# Patient Record
Sex: FEMALE | Race: WHITE | NOT HISPANIC OR LATINO
[De-identification: names, ages, dates, MRNs, and addresses within clinical notes are randomized per-mention and may not be internally consistent; named-entity substitution may affect disease eponyms.]

---

## 2017-05-01 ENCOUNTER — APPOINTMENT (OUTPATIENT)
Dept: RADIOLOGY | Facility: CLINIC | Age: 82
End: 2017-05-01

## 2017-05-01 ENCOUNTER — OUTPATIENT (OUTPATIENT)
Dept: OUTPATIENT SERVICES | Facility: HOSPITAL | Age: 82
LOS: 1 days | End: 2017-05-01
Payer: MEDICARE

## 2017-05-01 DIAGNOSIS — Z00.8 ENCOUNTER FOR OTHER GENERAL EXAMINATION: ICD-10-CM

## 2017-05-01 PROCEDURE — 72100 X-RAY EXAM L-S SPINE 2/3 VWS: CPT

## 2017-05-01 PROCEDURE — 73502 X-RAY EXAM HIP UNI 2-3 VIEWS: CPT

## 2017-05-01 PROCEDURE — 73502 X-RAY EXAM HIP UNI 2-3 VIEWS: CPT | Mod: 26,LT

## 2017-05-01 PROCEDURE — 72100 X-RAY EXAM L-S SPINE 2/3 VWS: CPT | Mod: 26

## 2017-05-12 ENCOUNTER — OUTPATIENT (OUTPATIENT)
Dept: OUTPATIENT SERVICES | Facility: HOSPITAL | Age: 82
LOS: 1 days | Discharge: HOME | End: 2017-05-12
Payer: MEDICARE

## 2017-05-12 DIAGNOSIS — M79.605 PAIN IN LEFT LEG: ICD-10-CM

## 2017-05-12 PROCEDURE — 93926 LOWER EXTREMITY STUDY: CPT | Mod: 26,LT

## 2021-03-10 ENCOUNTER — FORM ENCOUNTER (OUTPATIENT)
Age: 86
End: 2021-03-10

## 2021-03-11 ENCOUNTER — TRANSCRIPTION ENCOUNTER (OUTPATIENT)
Age: 86
End: 2021-03-11

## 2021-03-11 ENCOUNTER — EMERGENCY (EMERGENCY)
Facility: HOSPITAL | Age: 86
LOS: 0 days | Discharge: HOME | End: 2021-03-11
Attending: EMERGENCY MEDICINE
Payer: MEDICARE

## 2021-03-11 VITALS
SYSTOLIC BLOOD PRESSURE: 156 MMHG | RESPIRATION RATE: 18 BRPM | TEMPERATURE: 98 F | OXYGEN SATURATION: 100 % | DIASTOLIC BLOOD PRESSURE: 92 MMHG | HEART RATE: 75 BPM

## 2021-03-11 VITALS — HEIGHT: 66 IN | WEIGHT: 149.91 LBS

## 2021-03-11 DIAGNOSIS — R55 SYNCOPE AND COLLAPSE: ICD-10-CM

## 2021-03-11 DIAGNOSIS — H35.3290 EXUDATIVE AGE-RELATED MACULAR DEGENERATION, UNSPECIFIED EYE, STAGE UNSPECIFIED: ICD-10-CM

## 2021-03-11 DIAGNOSIS — U07.1 COVID-19: ICD-10-CM

## 2021-03-11 DIAGNOSIS — N39.0 URINARY TRACT INFECTION, SITE NOT SPECIFIED: ICD-10-CM

## 2021-03-11 DIAGNOSIS — I10 ESSENTIAL (PRIMARY) HYPERTENSION: ICD-10-CM

## 2021-03-11 LAB
ALBUMIN SERPL ELPH-MCNC: 3.3 G/DL — SIGNIFICANT CHANGE UP (ref 3.3–5)
ALP SERPL-CCNC: 82 U/L — SIGNIFICANT CHANGE UP (ref 40–120)
ALT FLD-CCNC: 20 U/L — SIGNIFICANT CHANGE UP (ref 12–78)
ANION GAP SERPL CALC-SCNC: 6 MMOL/L — SIGNIFICANT CHANGE UP (ref 5–17)
APPEARANCE UR: CLEAR — SIGNIFICANT CHANGE UP
AST SERPL-CCNC: 32 U/L — SIGNIFICANT CHANGE UP (ref 15–37)
BASOPHILS # BLD AUTO: 0.03 K/UL — SIGNIFICANT CHANGE UP (ref 0–0.2)
BASOPHILS NFR BLD AUTO: 0.6 % — SIGNIFICANT CHANGE UP (ref 0–2)
BILIRUB SERPL-MCNC: 0.4 MG/DL — SIGNIFICANT CHANGE UP (ref 0.2–1.2)
BILIRUB UR-MCNC: NEGATIVE — SIGNIFICANT CHANGE UP
BUN SERPL-MCNC: 17 MG/DL — SIGNIFICANT CHANGE UP (ref 7–23)
CALCIUM SERPL-MCNC: 8.2 MG/DL — LOW (ref 8.5–10.1)
CHLORIDE SERPL-SCNC: 103 MMOL/L — SIGNIFICANT CHANGE UP (ref 96–108)
CO2 SERPL-SCNC: 25 MMOL/L — SIGNIFICANT CHANGE UP (ref 22–31)
COLOR SPEC: YELLOW — SIGNIFICANT CHANGE UP
CREAT SERPL-MCNC: 1.28 MG/DL — SIGNIFICANT CHANGE UP (ref 0.5–1.3)
DIFF PNL FLD: ABNORMAL
EOSINOPHIL # BLD AUTO: 0.02 K/UL — SIGNIFICANT CHANGE UP (ref 0–0.5)
EOSINOPHIL NFR BLD AUTO: 0.4 % — SIGNIFICANT CHANGE UP (ref 0–6)
GLUCOSE SERPL-MCNC: 105 MG/DL — HIGH (ref 70–99)
GLUCOSE UR QL: NEGATIVE MG/DL — SIGNIFICANT CHANGE UP
HCT VFR BLD CALC: 41.1 % — SIGNIFICANT CHANGE UP (ref 34.5–45)
HGB BLD-MCNC: 13.7 G/DL — SIGNIFICANT CHANGE UP (ref 11.5–15.5)
IMM GRANULOCYTES NFR BLD AUTO: 0.2 % — SIGNIFICANT CHANGE UP (ref 0–1.5)
KETONES UR-MCNC: NEGATIVE — SIGNIFICANT CHANGE UP
LEUKOCYTE ESTERASE UR-ACNC: ABNORMAL
LYMPHOCYTES # BLD AUTO: 1.27 K/UL — SIGNIFICANT CHANGE UP (ref 1–3.3)
LYMPHOCYTES # BLD AUTO: 23.6 % — SIGNIFICANT CHANGE UP (ref 13–44)
MCHC RBC-ENTMCNC: 30.2 PG — SIGNIFICANT CHANGE UP (ref 27–34)
MCHC RBC-ENTMCNC: 33.3 GM/DL — SIGNIFICANT CHANGE UP (ref 32–36)
MCV RBC AUTO: 90.7 FL — SIGNIFICANT CHANGE UP (ref 80–100)
MONOCYTES # BLD AUTO: 0.66 K/UL — SIGNIFICANT CHANGE UP (ref 0–0.9)
MONOCYTES NFR BLD AUTO: 12.3 % — SIGNIFICANT CHANGE UP (ref 2–14)
NEUTROPHILS # BLD AUTO: 3.39 K/UL — SIGNIFICANT CHANGE UP (ref 1.8–7.4)
NEUTROPHILS NFR BLD AUTO: 62.9 % — SIGNIFICANT CHANGE UP (ref 43–77)
NITRITE UR-MCNC: NEGATIVE — SIGNIFICANT CHANGE UP
PH UR: 6.5 — SIGNIFICANT CHANGE UP (ref 5–8)
PLATELET # BLD AUTO: 208 K/UL — SIGNIFICANT CHANGE UP (ref 150–400)
POTASSIUM SERPL-MCNC: 4 MMOL/L — SIGNIFICANT CHANGE UP (ref 3.5–5.3)
POTASSIUM SERPL-SCNC: 4 MMOL/L — SIGNIFICANT CHANGE UP (ref 3.5–5.3)
PROT SERPL-MCNC: 7.3 GM/DL — SIGNIFICANT CHANGE UP (ref 6–8.3)
PROT UR-MCNC: 30 MG/DL
RBC # BLD: 4.53 M/UL — SIGNIFICANT CHANGE UP (ref 3.8–5.2)
RBC # FLD: 13.4 % — SIGNIFICANT CHANGE UP (ref 10.3–14.5)
SODIUM SERPL-SCNC: 134 MMOL/L — LOW (ref 135–145)
SP GR SPEC: 1.01 — SIGNIFICANT CHANGE UP (ref 1.01–1.02)
TROPONIN I SERPL-MCNC: 0.02 NG/ML — SIGNIFICANT CHANGE UP (ref 0.01–0.04)
UROBILINOGEN FLD QL: NEGATIVE MG/DL — SIGNIFICANT CHANGE UP
WBC # BLD: 5.38 K/UL — SIGNIFICANT CHANGE UP (ref 3.8–10.5)
WBC # FLD AUTO: 5.38 K/UL — SIGNIFICANT CHANGE UP (ref 3.8–10.5)

## 2021-03-11 PROCEDURE — 72125 CT NECK SPINE W/O DYE: CPT | Mod: 26

## 2021-03-11 PROCEDURE — 80053 COMPREHEN METABOLIC PANEL: CPT

## 2021-03-11 PROCEDURE — 36415 COLL VENOUS BLD VENIPUNCTURE: CPT

## 2021-03-11 PROCEDURE — 70450 CT HEAD/BRAIN W/O DYE: CPT | Mod: 26

## 2021-03-11 PROCEDURE — 81001 URINALYSIS AUTO W/SCOPE: CPT

## 2021-03-11 PROCEDURE — 84484 ASSAY OF TROPONIN QUANT: CPT

## 2021-03-11 PROCEDURE — 70450 CT HEAD/BRAIN W/O DYE: CPT

## 2021-03-11 PROCEDURE — 93005 ELECTROCARDIOGRAM TRACING: CPT

## 2021-03-11 PROCEDURE — 93010 ELECTROCARDIOGRAM REPORT: CPT

## 2021-03-11 PROCEDURE — 99284 EMERGENCY DEPT VISIT MOD MDM: CPT

## 2021-03-11 PROCEDURE — 72125 CT NECK SPINE W/O DYE: CPT

## 2021-03-11 PROCEDURE — 87086 URINE CULTURE/COLONY COUNT: CPT

## 2021-03-11 PROCEDURE — 71045 X-RAY EXAM CHEST 1 VIEW: CPT

## 2021-03-11 PROCEDURE — 85025 COMPLETE CBC W/AUTO DIFF WBC: CPT

## 2021-03-11 PROCEDURE — 71045 X-RAY EXAM CHEST 1 VIEW: CPT | Mod: 26

## 2021-03-11 PROCEDURE — 0225U NFCT DS DNA&RNA 21 SARSCOV2: CPT

## 2021-03-11 PROCEDURE — 96374 THER/PROPH/DIAG INJ IV PUSH: CPT | Mod: XU

## 2021-03-11 PROCEDURE — 96375 TX/PRO/DX INJ NEW DRUG ADDON: CPT | Mod: XU

## 2021-03-11 RX ORDER — LABETALOL HCL 100 MG
10 TABLET ORAL ONCE
Refills: 0 | Status: COMPLETED | OUTPATIENT
Start: 2021-03-11 | End: 2021-03-11

## 2021-03-11 RX ORDER — CEFTRIAXONE 500 MG/1
1000 INJECTION, POWDER, FOR SOLUTION INTRAMUSCULAR; INTRAVENOUS ONCE
Refills: 0 | Status: COMPLETED | OUTPATIENT
Start: 2021-03-11 | End: 2021-03-11

## 2021-03-11 RX ORDER — CEPHALEXIN 500 MG
1 CAPSULE ORAL
Qty: 10 | Refills: 0
Start: 2021-03-11 | End: 2021-03-15

## 2021-03-11 RX ADMIN — Medication 10 MILLIGRAM(S): at 21:17

## 2021-03-11 RX ADMIN — CEFTRIAXONE 1000 MILLIGRAM(S): 500 INJECTION, POWDER, FOR SOLUTION INTRAMUSCULAR; INTRAVENOUS at 22:40

## 2021-03-11 RX ADMIN — Medication 10 MILLIGRAM(S): at 19:56

## 2021-03-11 NOTE — ED PROVIDER NOTE - CLINICAL SUMMARY MEDICAL DECISION MAKING FREE TEXT BOX
CT, labs WNL.  UA ? UTI, treated in ED.  Pt COVID positive.  Satting well on RA at rest and ambulation.  No SOB in ED.  Discussed monoclonal antibody infusion which pt declines at this time.  Discussed recommendation for observation overnight, pt declines.  Abx sent to pharmacy.  Strict return precautions given.

## 2021-03-11 NOTE — ED PROVIDER NOTE - HIV OFFER
Central Prior Authorization Team   Phone: [phone]    MEDICATION APPEAL APPROVED    Medication: tadalafil (CIALIS) 5 MG tablet-Denied-Appeal Initiated-Appeal Approved-  Authorization Effective Date: 7/30/2019  Authorization Expiration Date: 8/14/2020  Approved Dose/Quantity:   Reference #:     Insurance Company: [employer] - Phone [phone] Fax [phone]  Expected CoPay:       CoPay Card Available:      Foundation Assistance Needed:    Which Pharmacy is filling the prescription (Not needed for infusion/clinic administered): [facility] - [facility]  [address] [place] [address]         Opt out

## 2021-03-11 NOTE — ED STATDOCS - PROGRESS NOTE DETAILS
Margo Castillo for attending Dr. Aponte: 89 y/o female with a PMHx of age related macular degeneration, carotid artery blockage, HTN presents to the ED s/p syncope 2 days ago. Pt reports she was in the kitchen taking the ice cubes out of the tray when she had a syncopal episode. On ASA and Plavix. Per family at bedside, pt's family members have been sick recently. Pt went to urgent care today for COVID testing, told the doctor there about syncopal episode with head injury and was sent to ED for imaging. Pt tested COVID + today at urgent care. No other complaints at this time. Will send pt to main ED for further evaluation. Margo Castillo for attending Dr. Aponte: 89 y/o female with a PMHx of age related macular degeneration, carotid artery blockage, HTN presents to the ED s/p syncope 2 days ago. Pt reports she was in the kitchen taking the ice cubes out of the tray when she had a syncopal episode. On ASA and Plavix. Per family at bedside, pt's family members have been sick recently. Pt went to urgent care today for COVID testing, told the doctor there about syncopal episode with head injury and was sent to ED for imaging. Pt tested COVID + today at urgent care. No other complaints at this time. On exam, ecchymosis left occipital area. Will send pt to main ED for further evaluation.

## 2021-03-11 NOTE — ED STATDOCS - NS_ ATTENDINGSCRIBEDETAILS _ED_A_ED_FT
I, Christopher Aponte MD, performed the initial face to face bedside interview with this patient regarding history of present illness and determined that the patient should be seen in the main ED.  The history, was documented by the scribe in my presence and I attest to the accuracy of the documentation.

## 2021-03-11 NOTE — ED PROVIDER NOTE - CARE PLAN
Principal Discharge DX:	Syncope  Secondary Diagnosis:	UTI (urinary tract infection)  Secondary Diagnosis:	COVID-19

## 2021-03-11 NOTE — ED ADULT NURSE NOTE - CHIEF COMPLAINT QUOTE
Pt arrives to ED sent from urgent care s/p syncopal episode. +COVID. +LOC. takes plavix daily. pt denies complaints at this time.

## 2021-03-11 NOTE — ED PROVIDER NOTE - PROGRESS NOTE DETAILS
Recommended pt stay for observation, antibody infusion. Pt declines. States she'll return for infusion at another day.  Strict return precautions given. Drake Armas D.O.

## 2021-03-11 NOTE — ED PROVIDER NOTE - OBJECTIVE STATEMENT
89 y/o female with a PMHx of age related macular degeneration, carotid artery blockage, HTN presents to the ED s/p syncope 2 days ago. Pt reports she was in the kitchen taking the ice cubes out of the tray when she had a syncopal episode. On ASA and Plavix. Per family at bedside, pt's family members have been sick recently. Pt went to urgent care today for COVID testing, told the doctor there about syncopal episode with head injury and was sent to ED for imaging. Pt tested COVID + today at urgent care. No other complaints at this time. On exam, ecchymosis left occipital area.

## 2021-03-11 NOTE — ED PROVIDER NOTE - PATIENT PORTAL LINK FT
You can access the FollowMyHealth Patient Portal offered by [facility] by registering at the following website: [email or web address]. By joining [facility]’s FollowMyHealth portal, you will also be able to view your health information using other applications (apps) compatible with our system.

## 2021-03-12 LAB
RAPID RVP RESULT: DETECTED
SARS-COV-2 RNA SPEC QL NAA+PROBE: DETECTED

## 2021-03-13 ENCOUNTER — TRANSCRIPTION ENCOUNTER (OUTPATIENT)
Age: 86
End: 2021-03-13

## 2021-03-13 ENCOUNTER — EMERGENCY (EMERGENCY)
Facility: HOSPITAL | Age: 86
LOS: 0 days | Discharge: HOME | End: 2021-03-13
Attending: EMERGENCY MEDICINE
Payer: MEDICARE

## 2021-03-13 VITALS
WEIGHT: 125 LBS | OXYGEN SATURATION: 96 % | HEIGHT: 66 IN | SYSTOLIC BLOOD PRESSURE: 167 MMHG | RESPIRATION RATE: 18 BRPM | HEART RATE: 97 BPM | TEMPERATURE: 99 F | DIASTOLIC BLOOD PRESSURE: 59 MMHG

## 2021-03-13 VITALS
SYSTOLIC BLOOD PRESSURE: 113 MMHG | DIASTOLIC BLOOD PRESSURE: 65 MMHG | RESPIRATION RATE: 18 BRPM | TEMPERATURE: 99 F | HEART RATE: 80 BPM | OXYGEN SATURATION: 95 %

## 2021-03-13 DIAGNOSIS — H35.3190 NONEXUDATIVE AGE-RELATED MACULAR DEGENERATION, UNSPECIFIED EYE, STAGE UNSPECIFIED: ICD-10-CM

## 2021-03-13 DIAGNOSIS — U07.1 COVID-19: ICD-10-CM

## 2021-03-13 DIAGNOSIS — I10 ESSENTIAL (PRIMARY) HYPERTENSION: ICD-10-CM

## 2021-03-13 LAB
CULTURE RESULTS: SIGNIFICANT CHANGE UP
SPECIMEN SOURCE: SIGNIFICANT CHANGE UP

## 2021-03-13 PROCEDURE — 96365 THER/PROPH/DIAG IV INF INIT: CPT

## 2021-03-13 PROCEDURE — 99284 EMERGENCY DEPT VISIT MOD MDM: CPT

## 2021-03-13 PROCEDURE — 99284 EMERGENCY DEPT VISIT MOD MDM: CPT | Mod: 25

## 2021-03-13 RX ORDER — ACETAMINOPHEN 500 MG
975 TABLET ORAL ONCE
Refills: 0 | Status: COMPLETED | OUTPATIENT
Start: 2021-03-13 | End: 2021-03-13

## 2021-03-13 RX ORDER — SODIUM CHLORIDE 9 MG/ML
250 INJECTION INTRAMUSCULAR; INTRAVENOUS; SUBCUTANEOUS
Refills: 0 | Status: COMPLETED | OUTPATIENT
Start: 2021-03-13 | End: 2021-03-13

## 2021-03-13 RX ADMIN — SODIUM CHLORIDE 25 MILLILITER(S): 9 INJECTION INTRAMUSCULAR; INTRAVENOUS; SUBCUTANEOUS at 15:01

## 2021-03-13 RX ADMIN — Medication 975 MILLIGRAM(S): at 15:30

## 2021-03-13 NOTE — ED PROVIDER NOTE - NS_ ATTENDINGSCRIBEDETAILS _ED_A_ED_FT
I, Jonathan Chu MD,  performed the initial face to face bedside interview with this patient regarding history of present illness, review of symptoms and relevant past medical, social and family history.  I completed an independent physical examination.  I was the initial provider who evaluated this patient.  The history, relevant review of systems, past medical and surgical history, medical decision making, and physical examination was documented by the scribe in my presence and I attest to the accuracy of the documentation.

## 2021-03-13 NOTE — ED PROVIDER NOTE - NSFOLLOWUPINSTRUCTIONS_ED_ALL_ED_FT
FOLLOW UP WITH PMD  WITHIN 1-2DAYS, CALL TO MAKE APPOINTMENT  COME BACK TO ED IF YOUR CONDITION WORSENS OR IF YOU DEVELOP FEVER GREATER THAN 100.4F, CHEST PAIN,  SHORTNESS OF BREATH OR ANY OTHER SYMPTOMS CONCERNING TO YOU  TAKE TYLENOL (ACETAMINOPHEN) 650 MG EVERY 6 HOURS AS NEEDED FOR PAIN    IF YOU WERE PRESRCIBED ANY MEDICATIONS FROM TODAY'S VISIT, TAKE THEM AS DIRECTED     What is COVID-19?  Coronavirus disease 2019, or "COVID-19," is an infection caused by a specific virus called SARS-CoV-2. The virus first appeared in late 2019 in the city of [place], [place]. But it has spread quickly since then, and there are now cases in many other places, including [place] and the [place].    People with COVID-19 can have fever, cough, and trouble breathing. Problems with breathing happen when the infection affects the lungs and causes pneumonia (figure 1).    Experts are studying this virus and will continue to learn more about it over time.    How is COVID-19 spread?  COVID-19 mainly spreads from person to person, similar to the flu. This usually happens when a sick person coughs or sneezes near other people. Doctors also think it is possible to get sick if you touch a surface that has the virus on it and then touch your mouth, nose, or eyes.    From what experts know so far, COVID-19 seems to spread most easily when people are showing symptoms. It is possible to spread it without having symptoms, too, but experts don't know how often this happens.    What are the symptoms of COVID-19?  Symptoms usually start a few days after a person is infected with the virus. But in some people it can take even longer for symptoms to appear.    Symptoms can include:    ?Fever    ?Cough    ?Feeling tired    ?Trouble breathing    ?Muscle aches    Although it is less common, some people have other symptoms, such as headache, sore throat, runny nose, or problems with their sense of smell. Some have digestive problems like nausea or diarrhea.    Most people have mild symptoms. Some people have no symptoms at all. But in other people, COVID-19 can lead to serious problems like pneumonia, not getting enough oxygen, heart problems, or even death. This is more common in people who are older or have other health problems like heart disease, diabetes, lung disease, or cancer.    While children can get COVID-19, they seem less likely to have severe symptoms.    Should I see a doctor or nurse?  If you have a fever, cough, or trouble breathing and might have been exposed to COVID-19, call your doctor or nurse. You might have been exposed if any of the following happened within the last 14 days:    ?You had close contact with a person who has the virus – This generally means being within about 6 feet of the person.    ?You lived in, or traveled to, an area where lots of people have the virus – The [place] Centers for Disease Control and Prevention (CDC) has information about which areas are affected. This can be found on their website: www.cdc.gov/coronavirus/2019-ncov/travelers/index.html.    If your symptoms are not severe, it is best to call your doctor, nurse, or clinic before you go in. They can tell you what to do and whether you need to be seen in person. Many people with only mild symptoms can stay home, and away from other people, until they get better. If you do need to go to the clinic or hospital, you will need to put on a face mask. The staff might also have you wait someplace away from other people.    If you are severely ill and need to go to the clinic or hospital right away, you should still call ahead. This way the staff can care for you while taking steps to protect others.    Your doctor or nurse will do an exam and ask about your symptoms. They will also ask questions about any recent travel and whether you have been around anyone who might be sick.    Will I need tests?  If your doctor or nurse suspects you have COVID-19, they might take a sample of fluid from inside your nose, and possibly your mouth, and send it to a lab for testing. If you are coughing up mucus, they might also test a sample of the mucus. These tests can show if you have COVID-19 or another infection.    In some areas, it might not be possible to test everyone who might have been exposed to the virus. If your doctor cannot test you, they might tell you to stay home and away from other people, and call if your symptoms get worse.    Your doctor might also order a chest X-ray or computed tomography (CT) scan to check your lungs.    How is COVID-19 treated?  There is no specific treatment for COVID-19. Many people will be able to stay home while they get better, but people with serious symptoms or other health problems might need to go to the hospital:    ?Mild illness – Most people with COVID-19 have only mild illness and can rest at home until they get better. People with mild symptoms seem to get better after about 2 weeks, but it's not the same for everyone.    If you are recovering from COVID-19, it's important to stay home, and away from other people, until your doctor or nurse tells you it's safe to go back to your normal activities. This decision will depend on how long it has been since you had symptoms, and in some cases, whether you have had a negative test (showing that the virus is no longer in your body).    ?Severe illness – If you have more severe illness, you might need to stay in the hospital, possibly in the intensive care unit (also called the "ICU"). While you are there, you will most likely be in a special "isolation" room. Only medical staff will be allowed in the room, and they will have to wear special gowns, gloves, masks, and eye protection.    The doctors and nurses can monitor and support your breathing and other body functions and make you as comfortable as possible. You might need extra oxygen to help you breathe easily. If you are having a very hard time breathing, you might need to be put on a ventilator. This is a machine to help you breathe.    Doctors are studying several different medicines to learn whether they might work to treat COVID-19. In certain cases, doctors might recommend these medicines.    Can COVID-19 be prevented?  There are things you can do to reduce your chances of getting COVID-19. These steps are a good idea for everyone, especially as the infection is spreading very quickly. But they are extra important for people age 65 years or older or who have other health problems. To help slow the spread of infection:    ?Wash your hands with soap and water often. This is especially important after being in public and touching other people or surfaces. Make sure to rub your hands with soap for at least 20 seconds, cleaning your wrists, fingernails, and in between your fingers. Then rinse your hands and dry them with a paper towel you can throw away.    If you are not near a sink, you can use a hand gel to clean your hands. The gels with at least 60 percent alcohol work the best. But it is better to wash with soap and water if you can.    ?Avoid touching your face with your hands, especially your mouth, nose, or eyes.    ?Try to stay away from people who have any symptoms of the infection.    ?Avoid crowds. If you live in an area where there have been cases of COVID-19, try to stay home as much as you can.    Even if you are healthy, limiting contact with other people can help slow the spread of disease. Experts call this "social distancing." In general, the recommendation is to cancel or postpone large gatherings such as sports events, concerts, festivals, parades, and weddings. But even smaller gatherings can be risky. If you do need to be around other people, be sure to wash your hands often and avoid contact when you can. For example, you can avoid handshakes and high fives, and encourage others to do the same.    ?Some experts recommend avoiding travel to certain countries where there are a lot of cases of COVID-19. Travel recommendations are changing often.    Experts do not recommend wearing a face mask if you are not sick, unless you are caring for someone who has (or might have) COVID-19.    If someone in your home has COVID-19, there are additional things you can do to protect yourself and others:    ?Keep the sick person away from others – The sick person should stay in a separate room and use a separate bathroom if possible. They should also eat in their own room.    ?Use face masks – The sick person should wear a face mask when they are in the same room as other people. If you are caring for the sick person, you can also protect yourself by wearing a face mask when you are in the room. This is especially important if the sick person cannot wear a mask.    ?Wash hands – Wash your hands with soap and water often (see above).    ?Clean often – Here are some specific things that can help:    •Wear disposable gloves when you clean. It's also a good idea to wear gloves when you have to touch the sick person's laundry, dishes, utensils, or trash.    •Regularly clean things that are touched a lot. This includes counters, bedside tables, doorknobs, computers, phones, and bathroom surfaces.    •Clean things in your home with soap and water, but also use disinfectants on appropriate surfaces. Some cleaning products work well to kill bacteria, but not viruses, so it's important to check labels. The [place] Environmental Protection Agency (EPA) has a list of products here: www.epa.gov/pesticide-registration/list-n-disinfectants-use-against-sars-cov-2.    There is not yet a vaccine to prevent COVID-19.    What should I do if there is a COVID-19 outbreak in my area?  The best thing you can do to stay healthy is to wash your hands regularly, avoid close contact with people who are sick, and stay home if you are sick. In addition, to help slow the spread of disease, it's important to follow any official instructions in your area about limiting contact with other people. Even if there are no cases of COVID-19 where you live, that could change in the future.    When a lot of cases of COVID-19 spread through one area, experts call this "community transmission." When this happens, schools or businesses in the area will likely close temporarily, and many events will be canceled. City and state leaders might also tell people to stay at home for some time. There are things you can do to prepare for this. For example, you might be able to work from home. You can also make sure you have a way to get in touch with relatives, neighbors, and others in your area. This way you will be able to receive and share information easily.    Rules and guidelines might be different in different areas. If officials do tell people in your area to stay home or avoid gathering with other people, it's important to take this seriously and follow instructions as best you can. Even if you are not at high risk of getting very sick from COVID-19, you could still pass it along to others. Keeping people away from each other is one of the best ways to control the spread of the virus.    What can I do to cope with stress and anxiety?  It is normal to feel anxious or worried about COVID-19. You can take care of yourself, and your family, by trying to:    ?Take breaks from the news    ?Get regular exercise and eat healthy foods    ?Try to find activities that you enjoy and can do in your home    ?Stay in touch with your friends and family members    Keep in mind that most people do not get severely ill or die from COVID-19. While it helps to be prepared, and it's important to do what you can to lower your risk and help slow the spread of the virus, try not to panic.    Where can I go to learn more?  As we learn more about this virus, expert recommendations will continue to change. Check with your doctor or public health official to get the most updated information about how to protect yourself.    You can also find more information about COVID-19 at the following websites:    ?[place] Centers for Disease Control and Prevention (CDC): www.cdc.gov/COVID19    ?World Health Organization (WHO): www.who.int/emergencies/diseases/novel-coronavirus-2019

## 2021-03-13 NOTE — ED PROVIDER NOTE - PATIENT PORTAL LINK FT
You can access the FollowMyHealth Patient Portal offered by [facility] by registering at the following website: [email or web address]. By joining [employer]’s FollowMyHealth portal, you will also be able to view your health information using other applications (apps) compatible with our system.

## 2021-03-13 NOTE — ED ADULT NURSE NOTE - OBJECTIVE STATEMENT
Pt came to ED under advice of PCP for monoclonal abs. Pt tested (+) for covid on 3/11 after being exposed from grandson. Pt denies cough, SOB, chest tightness, fever. Her only symptom is malaise.

## 2021-03-13 NOTE — ED PROVIDER NOTE - SHIFT CHANGE DETAILS
pt signed out to Dr. Paz pending monoclonal antibody and reassess. Jonathan Chu M.D., Attending Physician

## 2021-03-13 NOTE — ED ADULT TRIAGE NOTE - CHIEF COMPLAINT QUOTE
monoclonal antibody infusion, patient was seen on thursday for head injury and instructed today for monoclonal antibody infusion, unknown start date of covid but was tested on thursday monoclonal antibody infusion, patient was seen on thursday for head injury and instructed today for monoclonal antibody infusion, unknown start date of covid but was tested on thursday. daughter phone number [phone]

## 2021-03-13 NOTE — ED PROVIDER NOTE - CLINICAL SUMMARY MEDICAL DECISION MAKING FREE TEXT BOX
89 y/o female with PMHx of macular degeneration and HTN presents to the ED 89 y/o female with PMHx of macular degeneration and HTN presents to the ED for generalized malaise and COVID. Pt here for monoclonal antibodies - pt o2 95% ambulatory. she is well appearing non-toxic. exam non-focal. discussed monoclonal with patient that it is EUA- discussed risk benefit. pt states she wants it and then wants to go home. monoclonal form filled out. all questions answered. Jonathan Chu M.D., Attending Physician

## 2021-03-13 NOTE — ED PROVIDER NOTE - PROGRESS NOTE DETAILS
Patient has history of COVID-19 symptoms for [  ] days  Patient has high risk factors that include:  [  ] Chronic Kidney Disease [  ] Diabetes Mellitus [  ] Immune Suppressive [  ] Receiving Immunosuppressive Treatment [  ] BMI greater than or equal to 35 [  ] Age greater than or equal 65 years    Additional Risk Factors (>55y)  [x  ] Cardiovascular Disease [  ] HTN [  ] COPD/Other Chronic Respiratory Disease     Additional Risk Factors (12-17y):   [  ] Sickle Cell Disease [  ] Congenital/Acquired Heart Disease [  ] Neurodevelopmental Disorder [  ] Medical related technology dependence [  ] Asthma/Chronic Respiratory Disease [  ] Immune Suppressive Disease [  ] Receiving Immune Suppressive Therapy [  ] BMI greater than or equal to the 85th percentile    Patient received prior COVID treatment that included [ x ]  Patient provided explanation of monoclonal antibody infusion and in agreement with treatment plan. See consent form in medical record. [x ] Patient received monoclonal antibody infusion with no adverse reaction. Patient planned for discharge home and follow up with outpatient [facility] program.  [  ] Patient received monoclonal antibody infusion and experience an adverse reaction. Symptoms include [  ]. Patient treated with [  ]. Patient planned for [  ] nAgel Paz: pt endorsed to me from prior physician PENDING: DC home after MAB shashank: PT seen and reassessed.  Patient symptomatically improved.  Wants to be discharged AAOX3, NAD, VSS.  Discussed test results w/ patient. Patient verbalized understanding of hospital course and outpatient plans, has decisional making capacity.  Will f/u w/ pmd in the next few days; patient will call for an appointment. Will return to the ED if there is any worsening of symptoms.  Patient able to ambulate at baseline, is tolerating PO intake. Has safe way of getting home.

## 2021-03-13 NOTE — ED PROVIDER NOTE - OBJECTIVE STATEMENT
89 y/o female with PMHx of macular degeneration and HTN presents to the ED 89 y/o female with PMHx of macular degeneration and HTN presents to the ED for monoclonal antibody. pt was seen in the ED a few days ago on 3/11 for syncope found to have COVID- at that time it was recommended that patient have monoclonal antibody however pt did not want it at that time. after discussing with her outpatient doc she agreed to monoclonal. no f/c/cp/sob/cough/abd pain/n/v. does have generalized malaise and weakness.

## 2021-03-13 NOTE — ED ADULT NURSE NOTE - NSIMPLEMENTINTERV_GEN_ALL_ED
Implemented All Fall with Harm Risk Interventions:  [place] to call system. Call bell, personal items and telephone within reach. Instruct patient to call for assistance. Room bathroom lighting operational. Non-slip footwear when patient is off stretcher. Physically safe environment: no spills, clutter or unnecessary equipment. Stretcher in lowest position, wheels locked, appropriate side rails in place. Provide visual cue, wrist band, yellow gown, etc. Monitor gait and stability. Monitor for mental status changes and reorient to person, place, and time. Review medications for side effects contributing to fall risk. Reinforce activity limits and safety measures with patient and family. Provide visual clues: red socks.

## 2021-03-13 NOTE — ED ADULT NURSE NOTE - CHIEF COMPLAINT QUOTE
monoclonal antibody infusion, patient was seen on thursday for head injury and instructed today for monoclonal antibody infusion, unknown start date of covid but was tested on thursday. daughter phone number [phone]

## 2021-03-14 ENCOUNTER — TRANSCRIPTION ENCOUNTER (OUTPATIENT)
Age: 86
End: 2021-03-14

## 2021-06-11 ENCOUNTER — EMERGENCY (EMERGENCY)
Facility: HOSPITAL | Age: 86
LOS: 0 days | Discharge: HOME | End: 2021-06-11
Attending: EMERGENCY MEDICINE
Payer: MEDICARE

## 2021-06-11 VITALS
DIASTOLIC BLOOD PRESSURE: 79 MMHG | RESPIRATION RATE: 16 BRPM | TEMPERATURE: 98 F | SYSTOLIC BLOOD PRESSURE: 185 MMHG | HEART RATE: 75 BPM | OXYGEN SATURATION: 100 %

## 2021-06-11 VITALS — HEIGHT: 66 IN | WEIGHT: 125 LBS

## 2021-06-11 DIAGNOSIS — M25.552 PAIN IN LEFT HIP: ICD-10-CM

## 2021-06-11 DIAGNOSIS — R55 SYNCOPE AND COLLAPSE: ICD-10-CM

## 2021-06-11 DIAGNOSIS — Z86.69 PERSONAL HISTORY OF OTHER DISEASES OF THE NERVOUS SYSTEM AND SENSE ORGANS: ICD-10-CM

## 2021-06-11 DIAGNOSIS — Z87.81 PERSONAL HISTORY OF (HEALED) TRAUMATIC FRACTURE: ICD-10-CM

## 2021-06-11 DIAGNOSIS — I10 ESSENTIAL (PRIMARY) HYPERTENSION: ICD-10-CM
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
## 2021-06-11 LAB
ALBUMIN SERPL ELPH-MCNC: 3.4 G/DL — SIGNIFICANT CHANGE UP (ref 3.3–5)
ALP SERPL-CCNC: 95 U/L — SIGNIFICANT CHANGE UP (ref 40–120)
ALT FLD-CCNC: 16 U/L — SIGNIFICANT CHANGE UP (ref 12–78)
ANION GAP SERPL CALC-SCNC: 7 MMOL/L — SIGNIFICANT CHANGE UP (ref 5–17)
AST SERPL-CCNC: 23 U/L — SIGNIFICANT CHANGE UP (ref 15–37)
BASOPHILS # BLD AUTO: 0.06 K/UL — SIGNIFICANT CHANGE UP (ref 0–0.2)
BASOPHILS NFR BLD AUTO: 0.6 % — SIGNIFICANT CHANGE UP (ref 0–2)
BILIRUB SERPL-MCNC: 0.3 MG/DL — SIGNIFICANT CHANGE UP (ref 0.2–1.2)
BUN SERPL-MCNC: 26 MG/DL — HIGH (ref 7–23)
CALCIUM SERPL-MCNC: 8.6 MG/DL — SIGNIFICANT CHANGE UP (ref 8.5–10.1)
CHLORIDE SERPL-SCNC: 108 MMOL/L — SIGNIFICANT CHANGE UP (ref 96–108)
CK SERPL-CCNC: 77 U/L — SIGNIFICANT CHANGE UP (ref 26–192)
CO2 SERPL-SCNC: 22 MMOL/L — SIGNIFICANT CHANGE UP (ref 22–31)
CREAT SERPL-MCNC: 1.26 MG/DL — SIGNIFICANT CHANGE UP (ref 0.5–1.3)
EOSINOPHIL # BLD AUTO: 0.33 K/UL — SIGNIFICANT CHANGE UP (ref 0–0.5)
EOSINOPHIL NFR BLD AUTO: 3.4 % — SIGNIFICANT CHANGE UP (ref 0–6)
GLUCOSE SERPL-MCNC: 123 MG/DL — HIGH (ref 70–99)
HCT VFR BLD CALC: 43.3 % — SIGNIFICANT CHANGE UP (ref 34.5–45)
HGB BLD-MCNC: 14.1 G/DL — SIGNIFICANT CHANGE UP (ref 11.5–15.5)
IMM GRANULOCYTES NFR BLD AUTO: 0.3 % — SIGNIFICANT CHANGE UP (ref 0–1.5)
LIDOCAIN IGE QN: 276 U/L — SIGNIFICANT CHANGE UP (ref 73–393)
LYMPHOCYTES # BLD AUTO: 2.2 K/UL — SIGNIFICANT CHANGE UP (ref 1–3.3)
LYMPHOCYTES # BLD AUTO: 22.9 % — SIGNIFICANT CHANGE UP (ref 13–44)
MCHC RBC-ENTMCNC: 29.6 PG — SIGNIFICANT CHANGE UP (ref 27–34)
MCHC RBC-ENTMCNC: 32.6 GM/DL — SIGNIFICANT CHANGE UP (ref 32–36)
MCV RBC AUTO: 90.8 FL — SIGNIFICANT CHANGE UP (ref 80–100)
MONOCYTES # BLD AUTO: 0.82 K/UL — SIGNIFICANT CHANGE UP (ref 0–0.9)
MONOCYTES NFR BLD AUTO: 8.6 % — SIGNIFICANT CHANGE UP (ref 2–14)
NEUTROPHILS # BLD AUTO: 6.15 K/UL — SIGNIFICANT CHANGE UP (ref 1.8–7.4)
NEUTROPHILS NFR BLD AUTO: 64.2 % — SIGNIFICANT CHANGE UP (ref 43–77)
PLATELET # BLD AUTO: 281 K/UL — SIGNIFICANT CHANGE UP (ref 150–400)
POTASSIUM SERPL-MCNC: 4.1 MMOL/L — SIGNIFICANT CHANGE UP (ref 3.5–5.3)
POTASSIUM SERPL-SCNC: 4.1 MMOL/L — SIGNIFICANT CHANGE UP (ref 3.5–5.3)
PROT SERPL-MCNC: 7.4 GM/DL — SIGNIFICANT CHANGE UP (ref 6–8.3)
RBC # BLD: 4.77 M/UL — SIGNIFICANT CHANGE UP (ref 3.8–5.2)
RBC # FLD: 14.7 % — HIGH (ref 10.3–14.5)
SODIUM SERPL-SCNC: 137 MMOL/L — SIGNIFICANT CHANGE UP (ref 135–145)
TROPONIN I SERPL-MCNC: <0.015 NG/ML — SIGNIFICANT CHANGE UP (ref 0.01–0.04)
WBC # BLD: 9.59 K/UL — SIGNIFICANT CHANGE UP (ref 3.8–10.5)
WBC # FLD AUTO: 9.59 K/UL — SIGNIFICANT CHANGE UP (ref 3.8–10.5)

## 2021-06-11 PROCEDURE — 82550 ASSAY OF CK (CPK): CPT

## 2021-06-11 PROCEDURE — 99284 EMERGENCY DEPT VISIT MOD MDM: CPT | Mod: 25

## 2021-06-11 PROCEDURE — 76376 3D RENDER W/INTRP POSTPROCES: CPT

## 2021-06-11 PROCEDURE — 85025 COMPLETE CBC W/AUTO DIFF WBC: CPT

## 2021-06-11 PROCEDURE — 93005 ELECTROCARDIOGRAM TRACING: CPT

## 2021-06-11 PROCEDURE — 96374 THER/PROPH/DIAG INJ IV PUSH: CPT

## 2021-06-11 PROCEDURE — 73503 X-RAY EXAM HIP UNI 4/> VIEWS: CPT | Mod: 26,LT

## 2021-06-11 PROCEDURE — 72192 CT PELVIS W/O DYE: CPT

## 2021-06-11 PROCEDURE — 71045 X-RAY EXAM CHEST 1 VIEW: CPT | Mod: 26

## 2021-06-11 PROCEDURE — 84484 ASSAY OF TROPONIN QUANT: CPT

## 2021-06-11 PROCEDURE — 83690 ASSAY OF LIPASE: CPT

## 2021-06-11 PROCEDURE — 72192 CT PELVIS W/O DYE: CPT | Mod: 26,MA

## 2021-06-11 PROCEDURE — 70450 CT HEAD/BRAIN W/O DYE: CPT | Mod: 26,MA

## 2021-06-11 PROCEDURE — 70450 CT HEAD/BRAIN W/O DYE: CPT

## 2021-06-11 PROCEDURE — 99284 EMERGENCY DEPT VISIT MOD MDM: CPT

## 2021-06-11 PROCEDURE — 93010 ELECTROCARDIOGRAM REPORT: CPT

## 2021-06-11 PROCEDURE — 36415 COLL VENOUS BLD VENIPUNCTURE: CPT

## 2021-06-11 PROCEDURE — 73552 X-RAY EXAM OF FEMUR 2/>: CPT | Mod: LT

## 2021-06-11 PROCEDURE — 73503 X-RAY EXAM HIP UNI 4/> VIEWS: CPT | Mod: LT

## 2021-06-11 PROCEDURE — 73552 X-RAY EXAM OF FEMUR 2/>: CPT | Mod: 26,LT

## 2021-06-11 PROCEDURE — 76376 3D RENDER W/INTRP POSTPROCES: CPT | Mod: 26

## 2021-06-11 PROCEDURE — 71045 X-RAY EXAM CHEST 1 VIEW: CPT

## 2021-06-11 PROCEDURE — 96375 TX/PRO/DX INJ NEW DRUG ADDON: CPT

## 2021-06-11 PROCEDURE — 80053 COMPREHEN METABOLIC PANEL: CPT

## 2021-06-11 RX ORDER — SODIUM CHLORIDE 9 MG/ML
500 INJECTION INTRAMUSCULAR; INTRAVENOUS; SUBCUTANEOUS ONCE
Refills: 0 | Status: COMPLETED | OUTPATIENT
Start: 2021-06-11 | End: 2021-06-11

## 2021-06-11 RX ORDER — HYDRALAZINE HCL 50 MG
10 TABLET ORAL ONCE
Refills: 0 | Status: COMPLETED | OUTPATIENT
Start: 2021-06-11 | End: 2021-06-11

## 2021-06-11 RX ORDER — MORPHINE SULFATE 50 MG/1
2 CAPSULE, EXTENDED RELEASE ORAL ONCE
Refills: 0 | Status: DISCONTINUED | OUTPATIENT
Start: 2021-06-11 | End: 2021-06-11

## 2021-06-11 RX ADMIN — SODIUM CHLORIDE 500 MILLILITER(S): 9 INJECTION INTRAMUSCULAR; INTRAVENOUS; SUBCUTANEOUS at 19:43

## 2021-06-11 RX ADMIN — Medication 10 MILLIGRAM(S): at 19:43

## 2021-06-11 RX ADMIN — MORPHINE SULFATE 2 MILLIGRAM(S): 50 CAPSULE, EXTENDED RELEASE ORAL at 19:43

## 2021-06-11 NOTE — ED PROVIDER NOTE - OBJECTIVE STATEMENT
89 y/o F with PMHx of HTN presents to the ED c/o fall with syncope. Pt states she fell in the kitchen. Pt states left hip pain and asked grandson to bring her to the hospital. This is the fifth time she passed out within 5 years due to medication. Grandson said carotid arteries are blocked by 90%. Takes aspirin 2x a day.

## 2021-06-11 NOTE — ED PROVIDER NOTE - PATIENT PORTAL LINK FT
You can access the FollowMyHealth Patient Portal offered by [facility] by registering at the following website: [email or web address]. By joining [employer]’s FollowMyHealth portal, you will also be able to view your health information using other applications (apps) compatible with our system.

## 2021-06-11 NOTE — ED ADULT NURSE NOTE - CHIEF COMPLAINT QUOTE
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  Patient having left hip pain and groin pain.  On full dose ASA.  Unsure if she hit her head states she remembers everything.  Has multiple falls.

## 2021-06-11 NOTE — ED PROVIDER NOTE - PROGRESS NOTE DETAILS
kristi DRISCOLL: Pt stable entire Ed visit. Imaging negative for fracture or bleed will d/c. Family in agreement with plan.

## 2021-06-11 NOTE — ED PROVIDER NOTE - NS_ ATTENDINGSCRIBEDETAILS _ED_A_ED_FT
I, Cameron Velazquez MD,  performed the initial face to face bedside interview with this patient regarding history of present illness, review of symptoms and relevant past medical, social and family history.  I completed an independent physical examination.    The history, relevant review of systems, past medical and surgical history, medical decision making, and physical examination was documented by the scribe in my presence and I attest to the accuracy of the documentation.

## 2021-06-11 NOTE — ED ADULT TRIAGE NOTE - CHIEF COMPLAINT QUOTE
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  Patient having left hip pain and groin pain.  On full dose ASA.  Unsure if she hit her head.  Has multiple falls. 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  Patient having left hip pain and groin pain.  On full dose ASA.  Unsure if she hit her head states she remembers everything.  Has multiple falls.

## 2021-06-11 NOTE — ED ADULT NURSE NOTE - OBJECTIVE STATEMENT
Patient A&Ox3, presents to ED s/p unwitnessed fall at 11:30am. Questionable syncope, head strike, LOC. +blood thinner use, ASA. Pt unable to recall fall. Ambulating since fall with left hip pain. +AROM/PROM. Denies CP, SOB, HA, dizziness, blurred vision. Patient awake, alert and oriented. Respirations equal and unlabored.

## 2021-06-11 NOTE — ED ADULT NURSE NOTE - NSIMPLEMENTINTERV_GEN_ALL_ED
Implemented All Fall with Harm Risk Interventions:  [place] to call system. Call bell, personal items and telephone within reach. Instruct patient to call for assistance. Room bathroom lighting operational. Non-slip footwear when patient is off stretcher. Physically safe environment: no spills, clutter or unnecessary equipment. Stretcher in lowest position, wheels locked, appropriate side rails in place. Provide visual cue, wrist band, yellow gown, etc. Monitor gait and stability. Monitor for mental status changes and reorient to person, place, and time. Review medications for side effects contributing to fall risk. Reinforce activity limits and safety measures with patient and family. Provide visual clues: red socks.

## 2021-10-18 NOTE — ED ADULT TRIAGE NOTE - CHIEF COMPLAINT QUOTE
Sydni is a 74 year old who is being evaluated via a billable telephone visit.      What phone number would you like to be contacted at? [phone]  How would you like to obtain your AVS? [facility]      Palliative Care Clinic Progress Note    Patient Name: Sydni Mendoza  Primary Provider: Chan Putnam    Chief Complaint/Patient ID: 73yo woman with severe COPD on chronic oral steroids and 4L NC O2.    Last Palliative care appointment: 7/21/21 with Dr. Zamora. Previously discussed low dose morphine in the future for SOB. Encouraged use of a fan for recovery.     Reviewed: Yes, no record.     Interim History:  Sydni Mendoza 74 year old female returns to Palliative Care today for follow up via billable telephone encounter.      Things have been about the same. Still struggling with gaining weight back. Drinking one ensure a day. Has good appetite just can't gain weight.    Still having coughing fits, more often then previous. States it's exhausting. Sipping on warm water does help. Has never treated nasal symptoms but thinks could be contributing.    Does not sleep well at night but also takes on average 2 naps/day sleeping up to 1.5 hours at each nap.    Using fan to help with recovery, very helpful. Nebulizer in the past never helpful, wondering if she should try again. Not ready for morphine.    Social History:  Lives with her .   Social History     Tobacco Use     Smoking status: Former Smoker     Packs/day: 1.00     Years: 50.00     Pack years: 50.00     Types: Cigarettes     Quit date: 8/20/2011     Years since quitting: 10.1     Smokeless tobacco: Never Used   Substance Use Topics     Alcohol use: Yes     Alcohol/week: 0.0 standard drinks     Comment: occasional     Drug use: No       Family History- Reviewed in Epic.    Allergies   Allergen Reactions     Albuterol Palpitations     Is fine taking albuterol inhaler, tachycardia goes away.       Advanced Care Planning: Has completed  but has not had signed.  Says she has discussed with her  but she did not want it at the end of life.  POLST on file for DNR/DNI-Selective treatment.     Medications- Reviewed in Epic.    Past Medical History- Reviewed in [facility].    Past Surgical History- Reviewed in Epic.    Review of Systems:   ROS: 10 point ROS neg other than the symptoms noted above in the HPI.    Key Data Reviewed:  LABS: Last GFR 82 on 10/6/2020    Impression & Recommendations & Counseling:  Sydni Mendoza is a 74 year old female with history of severe COPD and dyspnea with mild exertion, with weight loss and functional decline, seen for follow-up.   Feels her symptoms have been ~stable the last few months.     Cough - Could be multifactorial, possible PND contributing.  - Recommended 2 weeks trial of nasal steroid 1 spray in each nostril BID, +/- and OTC antihistamine.    SOB - Stable, knows that PRN opioid is available if needed. Continuing to use fan with good benefit.    Weight loss - Relatively stable but struggling to gain weight. Only drinking one supplement daily.  - Encouraged to take in 2-3 Ensure shakes daily.    Difficulty sleeping - Not interested in medication. Has long naps during the day.  - Recommended decreasing length of time of naps, particularly afternoon nap. Suggested setting alarms so she doesn't sleep past a certain point.    Follow up: 3 months    Telephone Visit Details  Total length of phone call: 19 mins, start time 11:27AM    Total time spent on day of encounter is 24 mins, including reviewing record, review of above studies, above visit with patient, and documentation.     Linsey Loya,   Palliative Medicine   Pager [phone], AMCOM ID [id number]    Some chart documentation performed using Dragon Voice recognition Software. Although reviewed after completion, some words and grammatical errors may remain.     Pt arrives to ED sent from urgent care s/p syncopal episode. +COVID. +LOC. takes plavix daily. pt denies complaints at this time.

## 2023-06-28 ENCOUNTER — INPATIENT (INPATIENT)
Facility: HOSPITAL | Age: 88
LOS: 1 days | Discharge: HOME HEALTH | DRG: 243 | End: 2023-06-30
Attending: SURGERY | Admitting: INTERNAL MEDICINE
Payer: MEDICARE

## 2023-06-28 VITALS — HEIGHT: 62 IN | WEIGHT: 113.98 LBS

## 2023-06-28 DIAGNOSIS — Z95.828 PRESENCE OF OTHER VASCULAR IMPLANTS AND GRAFTS: ICD-10-CM

## 2023-06-28 DIAGNOSIS — I44.7 LEFT BUNDLE-BRANCH BLOCK, UNSPECIFIED: ICD-10-CM

## 2023-06-28 DIAGNOSIS — Z79.02 LONG TERM (CURRENT) USE OF ANTITHROMBOTICS/ANTIPLATELETS: ICD-10-CM

## 2023-06-28 DIAGNOSIS — E78.5 HYPERLIPIDEMIA, UNSPECIFIED: ICD-10-CM

## 2023-06-28 DIAGNOSIS — I44.2 ATRIOVENTRICULAR BLOCK, COMPLETE: ICD-10-CM

## 2023-06-28 DIAGNOSIS — Z79.82 LONG TERM (CURRENT) USE OF ASPIRIN: ICD-10-CM

## 2023-06-28 DIAGNOSIS — H35.30 UNSPECIFIED MACULAR DEGENERATION: ICD-10-CM

## 2023-06-28 DIAGNOSIS — I73.9 PERIPHERAL VASCULAR DISEASE, UNSPECIFIED: ICD-10-CM

## 2023-06-28 DIAGNOSIS — E44.0 MODERATE PROTEIN-CALORIE MALNUTRITION: ICD-10-CM

## 2023-06-28 DIAGNOSIS — I10 ESSENTIAL (PRIMARY) HYPERTENSION: ICD-10-CM

## 2023-06-28 LAB
ABO RH CONFIRMATION: SIGNIFICANT CHANGE UP
ALBUMIN SERPL ELPH-MCNC: 3.2 G/DL — LOW (ref 3.3–5)
ALP SERPL-CCNC: 60 U/L — SIGNIFICANT CHANGE UP (ref 40–120)
ALT FLD-CCNC: 14 U/L — SIGNIFICANT CHANGE UP (ref 12–78)
ANION GAP SERPL CALC-SCNC: 7 MMOL/L — SIGNIFICANT CHANGE UP (ref 5–17)
APTT BLD: 32 SEC — SIGNIFICANT CHANGE UP (ref 27.5–35.5)
AST SERPL-CCNC: 11 U/L — LOW (ref 15–37)
BASOPHILS # BLD AUTO: 0.07 K/UL — SIGNIFICANT CHANGE UP (ref 0–0.2)
BASOPHILS NFR BLD AUTO: 1 % — SIGNIFICANT CHANGE UP (ref 0–2)
BILIRUB SERPL-MCNC: 0.4 MG/DL — SIGNIFICANT CHANGE UP (ref 0.2–1.2)
BLD GP AB SCN SERPL QL: SIGNIFICANT CHANGE UP
BUN SERPL-MCNC: 21 MG/DL — SIGNIFICANT CHANGE UP (ref 7–23)
CALCIUM SERPL-MCNC: 8.4 MG/DL — LOW (ref 8.5–10.1)
CHLORIDE SERPL-SCNC: 110 MMOL/L — HIGH (ref 96–108)
CO2 SERPL-SCNC: 23 MMOL/L — SIGNIFICANT CHANGE UP (ref 22–31)
CREAT SERPL-MCNC: 1 MG/DL — SIGNIFICANT CHANGE UP (ref 0.5–1.3)
EGFR: 52 ML/MIN/1.73M2 — LOW
EOSINOPHIL # BLD AUTO: 0.16 K/UL — SIGNIFICANT CHANGE UP (ref 0–0.5)
EOSINOPHIL NFR BLD AUTO: 2.2 % — SIGNIFICANT CHANGE UP (ref 0–6)
GLUCOSE SERPL-MCNC: 92 MG/DL — SIGNIFICANT CHANGE UP (ref 70–99)
HCT VFR BLD CALC: 35.1 % — SIGNIFICANT CHANGE UP (ref 34.5–45)
HGB BLD-MCNC: 11.9 G/DL — SIGNIFICANT CHANGE UP (ref 11.5–15.5)
IMM GRANULOCYTES NFR BLD AUTO: 0.7 % — SIGNIFICANT CHANGE UP (ref 0–0.9)
INR BLD: 1.03 RATIO — SIGNIFICANT CHANGE UP (ref 0.88–1.16)
LYMPHOCYTES # BLD AUTO: 1.74 K/UL — SIGNIFICANT CHANGE UP (ref 1–3.3)
LYMPHOCYTES # BLD AUTO: 23.7 % — SIGNIFICANT CHANGE UP (ref 13–44)
MAGNESIUM SERPL-MCNC: 1.8 MG/DL — SIGNIFICANT CHANGE UP (ref 1.6–2.6)
MCHC RBC-ENTMCNC: 30.7 PG — SIGNIFICANT CHANGE UP (ref 27–34)
MCHC RBC-ENTMCNC: 33.9 GM/DL — SIGNIFICANT CHANGE UP (ref 32–36)
MCV RBC AUTO: 90.5 FL — SIGNIFICANT CHANGE UP (ref 80–100)
MONOCYTES # BLD AUTO: 0.57 K/UL — SIGNIFICANT CHANGE UP (ref 0–0.9)
MONOCYTES NFR BLD AUTO: 7.8 % — SIGNIFICANT CHANGE UP (ref 2–14)
NEUTROPHILS # BLD AUTO: 4.76 K/UL — SIGNIFICANT CHANGE UP (ref 1.8–7.4)
NEUTROPHILS NFR BLD AUTO: 64.6 % — SIGNIFICANT CHANGE UP (ref 43–77)
NT-PROBNP SERPL-SCNC: 2371 PG/ML — HIGH (ref 0–450)
PLATELET # BLD AUTO: 226 K/UL — SIGNIFICANT CHANGE UP (ref 150–400)
POTASSIUM SERPL-MCNC: 4.1 MMOL/L — SIGNIFICANT CHANGE UP (ref 3.5–5.3)
POTASSIUM SERPL-SCNC: 4.1 MMOL/L — SIGNIFICANT CHANGE UP (ref 3.5–5.3)
PROT SERPL-MCNC: 6.2 GM/DL — SIGNIFICANT CHANGE UP (ref 6–8.3)
PROTHROM AB SERPL-ACNC: 12 SEC — SIGNIFICANT CHANGE UP (ref 10.5–13.4)
RBC # BLD: 3.88 M/UL — SIGNIFICANT CHANGE UP (ref 3.8–5.2)
RBC # FLD: 14.9 % — HIGH (ref 10.3–14.5)
SODIUM SERPL-SCNC: 140 MMOL/L — SIGNIFICANT CHANGE UP (ref 135–145)
TROPONIN I, HIGH SENSITIVITY RESULT: 25.8 NG/L — SIGNIFICANT CHANGE UP
TSH SERPL-MCNC: 1.93 UU/ML — SIGNIFICANT CHANGE UP (ref 0.34–4.82)
WBC # BLD: 7.35 K/UL — SIGNIFICANT CHANGE UP (ref 3.8–10.5)
WBC # FLD AUTO: 7.35 K/UL — SIGNIFICANT CHANGE UP (ref 3.8–10.5)

## 2023-06-28 PROCEDURE — 70450 CT HEAD/BRAIN W/O DYE: CPT | Mod: MA

## 2023-06-28 PROCEDURE — 93005 ELECTROCARDIOGRAM TRACING: CPT

## 2023-06-28 PROCEDURE — 97162 PT EVAL MOD COMPLEX 30 MIN: CPT | Mod: GP

## 2023-06-28 PROCEDURE — 71045 X-RAY EXAM CHEST 1 VIEW: CPT

## 2023-06-28 PROCEDURE — 70450 CT HEAD/BRAIN W/O DYE: CPT | Mod: 26

## 2023-06-28 PROCEDURE — 80048 BASIC METABOLIC PNL TOTAL CA: CPT

## 2023-06-28 PROCEDURE — 83735 ASSAY OF MAGNESIUM: CPT

## 2023-06-28 PROCEDURE — 99291 CRITICAL CARE FIRST HOUR: CPT

## 2023-06-28 PROCEDURE — 93010 ELECTROCARDIOGRAM REPORT: CPT

## 2023-06-28 PROCEDURE — 85730 THROMBOPLASTIN TIME PARTIAL: CPT

## 2023-06-28 PROCEDURE — 71045 X-RAY EXAM CHEST 1 VIEW: CPT | Mod: 26

## 2023-06-28 PROCEDURE — C1898: CPT

## 2023-06-28 PROCEDURE — C1785: CPT

## 2023-06-28 PROCEDURE — 36415 COLL VENOUS BLD VENIPUNCTURE: CPT

## 2023-06-28 PROCEDURE — C1769: CPT

## 2023-06-28 PROCEDURE — 97116 GAIT TRAINING THERAPY: CPT | Mod: GP

## 2023-06-28 PROCEDURE — 84100 ASSAY OF PHOSPHORUS: CPT

## 2023-06-28 PROCEDURE — 85027 COMPLETE CBC AUTOMATED: CPT

## 2023-06-28 PROCEDURE — 85610 PROTHROMBIN TIME: CPT

## 2023-06-28 PROCEDURE — C1894: CPT

## 2023-06-28 RX ORDER — DOXAZOSIN MESYLATE 4 MG
1 TABLET ORAL
Refills: 0 | DISCHARGE

## 2023-06-28 RX ORDER — ASPIRIN/CALCIUM CARB/MAGNESIUM 324 MG
81 TABLET ORAL DAILY
Refills: 0 | Status: DISCONTINUED | OUTPATIENT
Start: 2023-06-28 | End: 2023-06-30

## 2023-06-28 RX ORDER — MULTIVIT-MIN/FERROUS GLUCONATE 9 MG/15 ML
1 LIQUID (ML) ORAL
Refills: 0 | DISCHARGE

## 2023-06-28 RX ORDER — CHLORHEXIDINE GLUCONATE 213 G/1000ML
1 SOLUTION TOPICAL
Refills: 0 | Status: DISCONTINUED | OUTPATIENT
Start: 2023-06-28 | End: 2023-06-30

## 2023-06-28 RX ORDER — SIMVASTATIN 20 MG/1
1 TABLET, FILM COATED ORAL
Refills: 0 | DISCHARGE

## 2023-06-28 RX ORDER — CHOLECALCIFEROL (VITAMIN D3) 125 MCG
1 CAPSULE ORAL
Refills: 0 | DISCHARGE

## 2023-06-28 RX ORDER — SIMVASTATIN 20 MG/1
0 TABLET, FILM COATED ORAL
Refills: 0 | DISCHARGE

## 2023-06-28 RX ORDER — GABAPENTIN 400 MG/1
1 CAPSULE ORAL
Refills: 0 | DISCHARGE

## 2023-06-28 RX ORDER — NIFEDIPINE 30 MG
1 TABLET, EXTENDED RELEASE 24 HR ORAL
Refills: 0 | DISCHARGE

## 2023-06-28 RX ORDER — ASPIRIN/CALCIUM CARB/MAGNESIUM 324 MG
1 TABLET ORAL
Refills: 0 | DISCHARGE

## 2023-06-28 RX ORDER — CLOPIDOGREL BISULFATE 75 MG/1
1 TABLET, FILM COATED ORAL
Refills: 0 | DISCHARGE

## 2023-06-28 RX ORDER — SIMVASTATIN 20 MG/1
40 TABLET, FILM COATED ORAL AT BEDTIME
Refills: 0 | Status: DISCONTINUED | OUTPATIENT
Start: 2023-06-28 | End: 2023-06-30

## 2023-06-28 RX ADMIN — Medication 81 MILLIGRAM(S): at 17:36

## 2023-06-28 RX ADMIN — SIMVASTATIN 40 MILLIGRAM(S): 20 TABLET, FILM COATED ORAL at 21:22

## 2023-06-28 NOTE — ED ADULT NURSE NOTE - OBJECTIVE STATEMENT
patient sent in by Dr Valdez for low heart rate, dizziness, and pacemaker placement. Patient is A&Ox2. c/o feeling off. Patient placed on pads along with heart monitor.

## 2023-06-28 NOTE — PATIENT PROFILE ADULT - FALL HARM RISK - HARM RISK INTERVENTIONS

## 2023-06-28 NOTE — PATIENT PROFILE ADULT - OVER THE PAST TWO WEEKS, HAVE YOU FELT LITTLE INTEREST OR PLEASURE IN DOING THINGS?
no
good air movement/airway patent/respirations non-labored/breath sounds equal/clear to auscultation bilaterally

## 2023-06-28 NOTE — ED PROVIDER NOTE - CLINICAL SUMMARY MEDICAL DECISION MAKING FREE TEXT BOX
89 y/o with lightheadedness, dizziness, generalized weakness, found to be in complete heart block, sent in by cardiologist for evaluation. will contact EP, screening EKG, CXR, labs, and admit. 89 y/o with lightheadedness, dizziness, generalized weakness, found to be in complete heart block, sent in by cardiologist for evaluation. will contact EP, screening EKG, CXR, labs, and admit.    Martha DO: EP at bedside; plan for PPM tomorrow; admit to CCU

## 2023-06-28 NOTE — ED PROVIDER NOTE - CONSTITUTIONAL, MLM
normal... Well appearing, awake, alert, oriented to person, place, time/situation and in no apparent distress. Well appearing, awake, alert, oriented to person, place, not time/situation and in no apparent distress.

## 2023-06-28 NOTE — ED PROVIDER NOTE - CARDIAC, MLM
Normal rate, regular rhythm.  Heart sounds S1, S2.  No murmurs, rubs or gallops. bradycardic; Heart sounds S1, S2.  No murmurs, rubs or gallops.

## 2023-06-28 NOTE — ED ADULT NURSE NOTE - NSFALLHARMRISKINTERV_ED_ALL_ED

## 2023-06-28 NOTE — CONSULT NOTE ADULT - ASSESSMENT
89 year old female with PMHx HTN, HLD, PAD s/p stenting of b/l common iliac artery (2012) presenting for complete heart block.    Currently in no acute distress, without hemodynamic instability  Close monitoring in CCU  Hold all AV taurus blocking agents  Hold Plavix and nifedipine  Atropine and pads at bedside  Will require TVP if hemodynamic instability  Planning for PPM placement in AM  NPOpMN

## 2023-06-28 NOTE — H&P ADULT - ASSESSMENT
89F with no sig past medical history, presents after being seen in her PMD office and being found to be in CHB, in the 40s. Her BP is 110/70 and she is breathing comfortably. EKG confirms CHB.      Imp/Plan:  1) Symptomatic Complete Heart Block: No history of taurus blocking agents, electrolytes normal. WIll observe in CCU for now. If develops any pauses, is symptomatic or has any BP changes, will place temporary TV wire. EPS evaluated and is planning for PPM in AM.  External pads applied, Zoll and Atropine at bedside. Will hydrate gently overnight and keep NPO after Midnight. SCD for DVT prophylaxis     89F with no sig past medical history, presents after being seen in her PMD office and being found to be in CHB, in the 40s. Her BP is 110/70 and she is breathing comfortably. EKG confirms CHB.      Imp/Plan:  1) Symptomatic Complete Heart Block: No history of taurus blocking agents, electrolytes normal. WIll observe in CCU for now. If develops any pauses, is symptomatic or has any BP changes, will place temporary TV wire. EPS evaluated and is planning for PPM in AM.  External pads applied, Zoll and Atropine at bedside. Hold Nifedipine,and Plavix. Continue ASA and Statin  Will hydrate gently overnight and keep NPO after Midnight. SCD for DVT prophylaxis        2) HTN: Controlled now. Will hold on adding any medications for now given CHB. If needed would use short acting IVP Hydralazine, for BP > 160.        A total of 40 mins was spent evaluating and treating this critical patient, including review of the EMR and speaking with the patient and staff

## 2023-06-28 NOTE — ED PROVIDER NOTE - OBJECTIVE STATEMENT
89 y/o female w/PMHx of age-related macular degeneration, HTN presents to ED sent by cardio Dr. Valdez for bradycardia in 40s. As per daughter at bedside, pt went to PCP for headache, eye pain, and generalized pain. Pt was found to be hypotensive which has since resolved, EKG showed LBBB with low HR, so was instructed to see Dr. Valdez who spoke to Dr. Lugo, they agreed with patient to come to ED. Patient endorses intermittent lightheadedness for the last week,  unsure of LOC. Patient with positive head strike few weeks ago while doing laundry. Patient oriented to person, place, not time. No cardiac stents. 89 y/o female w/PMHx of age-related macular degeneration, HTN presents to ED sent by cardio Dr. Valdez for bradycardia in 40s. As per daughter at bedside, pt went to PCP for headache, and generalized pain; lightheadedness; dizziness; EKG showed LBBB with bradycardia so was instructed to see Dr. Valdez who spoke to Dr. Lugo, they agreed with patient to come to ED. Patient endorses intermittent lightheadedness for the last week,  unsure of LOC. Patient with positive head strike few weeks ago while doing laundry. Patient oriented to person, place, not time. No cardiac stents.

## 2023-06-28 NOTE — CONSULT NOTE ADULT - SUBJECTIVE AND OBJECTIVE BOX
HPI:  Patient is a 89 year old female with PMHx HTN, HLD, PAD s/p stenting of b/l common iliac artery (2012) presenting from her cardiologist office for evaluation of complete heart block. She reports that she has been experiencing intermittent fatigue, lightheadedness, dizziness for at least 6 months. She notes a fall in December 2022 when she tripped as well as an episode of falling 2-3 weeks ago. On the most recent episode, she was doing laundry when she bent over and fell forward, hitting her left hip and head. She denies LOC. She denies fevers, chills, vision changes, syncope, chest pain, palpitations, dyspnea, abd pain, n/v, LE edema.    Home medications include: ASA 325mg, Plavix 75mg, Nifedipine, Statin    ECG: complete heart block, VR 45, LBBB, LAD  Telemetry: complete heart block, HR 40-50bpm    PAST MEDICAL & SURGICAL HISTORY:  HTN (hypertension)      Age-related macular degeneration      H/O elbow surgery      Coccyx pain  treated with removal          MEDICATIONS  (STANDING):  aspirin enteric coated 81 milliGRAM(s) Oral daily  chlorhexidine 4% Liquid 1 Application(s) Topical <User Schedule>  simvastatin 40 milliGRAM(s) Oral at bedtime    MEDICATIONS  (PRN):      Allergies    No Known Allergies    Intolerances        SOCIAL HISTORY: Denies tobacco, etoh abuse or illicit drug use    FAMILY HISTORY:      Vital Signs Last 24 Hrs  T(C): 36.6 (28 Jun 2023 14:52), Max: 36.6 (28 Jun 2023 12:55)  T(F): 97.9 (28 Jun 2023 14:52), Max: 97.9 (28 Jun 2023 14:52)  HR: 42 (28 Jun 2023 15:30) (42 - 44)  BP: 144/59 (28 Jun 2023 15:30) (110/59 - 230/42)  BP(mean): 82 (28 Jun 2023 15:30) (75 - 82)  RR: 14 (28 Jun 2023 15:30) (14 - 16)  SpO2: 99% (28 Jun 2023 15:30) (99% - 99%)    Parameters below as of 28 Jun 2023 15:30  Patient On (Oxygen Delivery Method): room air        REVIEW OF SYSTEMS:    CONSTITUTIONAL:  As per HPI.  HEENT:  Eyes:  No diplopia or blurred vision. ENT:  No earache, sore throat or runny nose.  CARDIOVASCULAR:  No pressure, squeezing, strangling, tightness, heaviness or aching about the chest, neck, axilla or epigastrium.  RESPIRATORY:  No cough, shortness of breath, PND or orthopnea.  GASTROINTESTINAL:  No nausea, vomiting or diarrhea.  GENITOURINARY:  No dysuria, frequency or urgency.  MUSCULOSKELETAL:  As per HPI.  SKIN:  No change in skin, hair or nails.  NEUROLOGIC:  No paresthesias, fasciculations, seizures  PSYCHIATRIC:  No disorder of thought or mood.  ENDOCRINE:  No heat or cold intolerance, polyuria or polydipsia.  HEMATOLOGICAL:  No easy bruising or bleedings:  .     PHYSICAL EXAMINATION:    GENERAL APPEARANCE:  Pt. is not currently dyspneic, in no distress. Pt. is alert, oriented, and pleasant.  HEENT:  Pupils are normal and react normally. No icterus. Mucous membranes well colored.  NECK:  Supple. No lymphadenopathy. Jugular venous pressure not elevated. Carotids equal.   HEART:   Irregular S1, S2 There are no murmurs, rubs or gallops noted  CHEST:  Chest is clear to auscultation. Normal respiratory effort.  ABDOMEN:  Soft and nontender.   EXTREMITIES:  There is no edema.   SKIN:  No rash or significant lesions are noted.    I&O's Summary      LABS:                        11.9   7.35  )-----------( 226      ( 28 Jun 2023 13:35 )             35.1     06-28    140  |  110<H>  |  21  ----------------------------<  92  4.1   |  23  |  1.00    Ca    8.4<L>      28 Jun 2023 13:35  Mg     1.8     06-28    TPro  6.2  /  Alb  3.2<L>  /  TBili  0.4  /  DBili  x   /  AST  11<L>  /  ALT  14  /  AlkPhos  60  06-28    LIVER FUNCTIONS - ( 28 Jun 2023 13:35 )  Alb: 3.2 g/dL / Pro: 6.2 gm/dL / ALK PHOS: 60 U/L / ALT: 14 U/L / AST: 11 U/L / GGT: x           PT/INR - ( 28 Jun 2023 13:35 )   PT: 12.0 sec;   INR: 1.03 ratio         PTT - ( 28 Jun 2023 13:35 )  PTT:32.0 sec      Urinalysis Basic - ( 28 Jun 2023 13:35 )    Color: x / Appearance: x / SG: x / pH: x  Gluc: 92 mg/dL / Ketone: x  / Bili: x / Urobili: x   Blood: x / Protein: x / Nitrite: x   Leuk Esterase: x / RBC: x / WBC x   Sq Epi: x / Non Sq Epi: x / Bacteria: x        RADIOLOGY & ADDITIONAL STUDIES:    CTH (6/28/23): IMPRESSION: No acute intracranial hemorrhage or mass effect.  CXR (6/28/23): IMPRESSION:  1. Areas of scarring and fibrosis in the left lower lobe, unchanged from the prior exam.  2. No evidence of pneumonia, pleural effusion or CHF.  3. Additional chronic findings as outlined above, similar to the prior exam.    ASSESSMENT & PLAN: HPI:  Patient is a 89 year old female with PMHx HTN, HLD, PAD s/p stenting of b/l common iliac artery (2012) presenting from her cardiologist office for evaluation of complete heart block. She reports that she has been experiencing intermittent fatigue, lightheadedness, dizziness for at least 6 months. She notes a fall in December 2022 when she tripped as well as an episode of falling 2-3 weeks ago. On the most recent episode, she was doing laundry when she bent over and fell forward, hitting her left hip and head. She denies LOC. She denies fevers, chills, vision changes, syncope, chest pain, palpitations, dyspnea, abd pain, n/v, LE edema.    Home medications include: ASA 325mg, Plavix 75mg, Nifedipine, Statin    ECG: complete heart block, AR 72, VR 45, LBBB, LAD  Telemetry: complete heart block, HR 40-50bpm    PAST MEDICAL & SURGICAL HISTORY:  HTN (hypertension)      Age-related macular degeneration      H/O elbow surgery      Coccyx pain  treated with removal          MEDICATIONS  (STANDING):  aspirin enteric coated 81 milliGRAM(s) Oral daily  chlorhexidine 4% Liquid 1 Application(s) Topical <User Schedule>  simvastatin 40 milliGRAM(s) Oral at bedtime    MEDICATIONS  (PRN):      Allergies    No Known Allergies    Intolerances        SOCIAL HISTORY: Denies tobacco, etoh abuse or illicit drug use    FAMILY HISTORY:      Vital Signs Last 24 Hrs  T(C): 36.6 (28 Jun 2023 14:52), Max: 36.6 (28 Jun 2023 12:55)  T(F): 97.9 (28 Jun 2023 14:52), Max: 97.9 (28 Jun 2023 14:52)  HR: 42 (28 Jun 2023 15:30) (42 - 44)  BP: 144/59 (28 Jun 2023 15:30) (110/59 - 230/42)  BP(mean): 82 (28 Jun 2023 15:30) (75 - 82)  RR: 14 (28 Jun 2023 15:30) (14 - 16)  SpO2: 99% (28 Jun 2023 15:30) (99% - 99%)    Parameters below as of 28 Jun 2023 15:30  Patient On (Oxygen Delivery Method): room air        REVIEW OF SYSTEMS:    CONSTITUTIONAL:  As per HPI.  HEENT:  Eyes:  No diplopia or blurred vision. ENT:  No earache, sore throat or runny nose.  CARDIOVASCULAR:  No pressure, squeezing, strangling, tightness, heaviness or aching about the chest, neck, axilla or epigastrium.  RESPIRATORY:  No cough, shortness of breath, PND or orthopnea.  GASTROINTESTINAL:  No nausea, vomiting or diarrhea.  GENITOURINARY:  No dysuria, frequency or urgency.  MUSCULOSKELETAL:  As per HPI.  SKIN:  No change in skin, hair or nails.  NEUROLOGIC:  No paresthesias, fasciculations, seizures  PSYCHIATRIC:  No disorder of thought or mood.  ENDOCRINE:  No heat or cold intolerance, polyuria or polydipsia.  HEMATOLOGICAL:  No easy bruising or bleedings:  .     PHYSICAL EXAMINATION:    GENERAL APPEARANCE:  Pt. is not currently dyspneic, in no distress. Pt. is alert, oriented, and pleasant.  HEENT:  Pupils are normal and react normally. No icterus. Mucous membranes well colored.  NECK:  Supple. No lymphadenopathy. Jugular venous pressure not elevated. Carotids equal.   HEART:   Irregular S1, S2 There are no murmurs, rubs or gallops noted  CHEST:  Chest is clear to auscultation. Normal respiratory effort.  ABDOMEN:  Soft and nontender.   EXTREMITIES:  There is no edema.   SKIN:  No rash or significant lesions are noted.    I&O's Summary      LABS:                        11.9   7.35  )-----------( 226      ( 28 Jun 2023 13:35 )             35.1     06-28    140  |  110<H>  |  21  ----------------------------<  92  4.1   |  23  |  1.00    Ca    8.4<L>      28 Jun 2023 13:35  Mg     1.8     06-28    TPro  6.2  /  Alb  3.2<L>  /  TBili  0.4  /  DBili  x   /  AST  11<L>  /  ALT  14  /  AlkPhos  60  06-28    LIVER FUNCTIONS - ( 28 Jun 2023 13:35 )  Alb: 3.2 g/dL / Pro: 6.2 gm/dL / ALK PHOS: 60 U/L / ALT: 14 U/L / AST: 11 U/L / GGT: x           PT/INR - ( 28 Jun 2023 13:35 )   PT: 12.0 sec;   INR: 1.03 ratio         PTT - ( 28 Jun 2023 13:35 )  PTT:32.0 sec      Urinalysis Basic - ( 28 Jun 2023 13:35 )    Color: x / Appearance: x / SG: x / pH: x  Gluc: 92 mg/dL / Ketone: x  / Bili: x / Urobili: x   Blood: x / Protein: x / Nitrite: x   Leuk Esterase: x / RBC: x / WBC x   Sq Epi: x / Non Sq Epi: x / Bacteria: x        RADIOLOGY & ADDITIONAL STUDIES:    CTH (6/28/23): IMPRESSION: No acute intracranial hemorrhage or mass effect.  CXR (6/28/23): IMPRESSION:  1. Areas of scarring and fibrosis in the left lower lobe, unchanged from the prior exam.  2. No evidence of pneumonia, pleural effusion or CHF.  3. Additional chronic findings as outlined above, similar to the prior exam.    ASSESSMENT & PLAN:

## 2023-06-28 NOTE — PHARMACOTHERAPY INTERVENTION NOTE - COMMENTS
Medication reconciliation completed.  Patient was unable to provide medication information, spoke to daughter Rebecca at bedside and they provided most of pt's current medication list; confirmed with Dr. First Stern.  Rebecca has a written list of pt's daily medication but it does not indicated the strength of pt's Simvastatin nor Doxycycline, pt's family keeps medication locked up and Rebecca's son will not be home until ~5pm to provide strengths.  Rebecca confirms that pt had been taking a daily Doxycycline but was told by their Cardiologist to stop and pt did not take the dose yesterday PM. Medication reconciliation completed.  Patient was unable to provide medication information, spoke to daughter Rebecca at bedside and they provided most of pt's current medication list; confirmed with Dr. First Stern.  Rebecca has a written list of pt's daily medication but it does not indicated the strength of pt's Simvastatin nor Doxycycline, pt's family keeps medication locked up and Rebecca's son will not be home until ~5pm to provide strengths.  Rebecca confirms that pt had been taking a daily Doxycycline but was told by their Cardiologist to stop and pt did not take the dose yesterday PM.    EDIT: 6:26pm  Rebecca provided the missing medication information for Marissa; pt's Simvastatin dose is 20mg and her other evening medication was clarified to be Doxazosin 4mg (initially Rebecca gave the name Doxycycline).  Doxazosin was stopped as of yesterday 6-27 by her cardiologist.

## 2023-06-28 NOTE — H&P ADULT - HISTORY OF PRESENT ILLNESS
89F with no sig past medical history, presents after being seen in her PMD office and being found to be in CHB, in the 40s. Her BP is 110/70 and she is breathing comfortably. She admits to episodes of dizziness and lightheadedness recently. Unclear if she has had syncope.  Denies chest pain. sitting in bed now without symptoms     No history of Meche blocking agents  89F with a PmHx of HTN, HLD, presents after being seen in her PMD office and being found to be in CHB, in the 40s. Her BP is 110/70 and she is breathing comfortably. She admits to episodes of dizziness and lightheadedness recently. Unclear if she has had syncope.  Denies chest pain. sitting in bed now without symptoms     No history of Meche blocking agents, was on NIfedipine, ASA/Plavix

## 2023-06-28 NOTE — ED PROVIDER NOTE - NEUROLOGICAL, MLM
Alert and oriented, no focal deficits, no motor or sensory deficits. Alert and oriented x2. no focal deficits, no motor or sensory deficits.

## 2023-06-29 LAB
ANION GAP SERPL CALC-SCNC: 6 MMOL/L — SIGNIFICANT CHANGE UP (ref 5–17)
APTT BLD: 32 SEC — SIGNIFICANT CHANGE UP (ref 27.5–35.5)
BUN SERPL-MCNC: 19 MG/DL — SIGNIFICANT CHANGE UP (ref 7–23)
CALCIUM SERPL-MCNC: 8.6 MG/DL — SIGNIFICANT CHANGE UP (ref 8.5–10.1)
CHLORIDE SERPL-SCNC: 110 MMOL/L — HIGH (ref 96–108)
CO2 SERPL-SCNC: 24 MMOL/L — SIGNIFICANT CHANGE UP (ref 22–31)
CREAT SERPL-MCNC: 0.84 MG/DL — SIGNIFICANT CHANGE UP (ref 0.5–1.3)
EGFR: 64 ML/MIN/1.73M2 — SIGNIFICANT CHANGE UP
GLUCOSE SERPL-MCNC: 93 MG/DL — SIGNIFICANT CHANGE UP (ref 70–99)
HCT VFR BLD CALC: 41.6 % — SIGNIFICANT CHANGE UP (ref 34.5–45)
HGB BLD-MCNC: 13.6 G/DL — SIGNIFICANT CHANGE UP (ref 11.5–15.5)
INR BLD: 1.07 RATIO — SIGNIFICANT CHANGE UP (ref 0.88–1.16)
MAGNESIUM SERPL-MCNC: 1.9 MG/DL — SIGNIFICANT CHANGE UP (ref 1.6–2.6)
MCHC RBC-ENTMCNC: 30.1 PG — SIGNIFICANT CHANGE UP (ref 27–34)
MCHC RBC-ENTMCNC: 32.7 GM/DL — SIGNIFICANT CHANGE UP (ref 32–36)
MCV RBC AUTO: 92 FL — SIGNIFICANT CHANGE UP (ref 80–100)
PHOSPHATE SERPL-MCNC: 2.6 MG/DL — SIGNIFICANT CHANGE UP (ref 2.5–4.5)
PLATELET # BLD AUTO: 241 K/UL — SIGNIFICANT CHANGE UP (ref 150–400)
POTASSIUM SERPL-MCNC: 3.5 MMOL/L — SIGNIFICANT CHANGE UP (ref 3.5–5.3)
POTASSIUM SERPL-SCNC: 3.5 MMOL/L — SIGNIFICANT CHANGE UP (ref 3.5–5.3)
PROTHROM AB SERPL-ACNC: 12.4 SEC — SIGNIFICANT CHANGE UP (ref 10.5–13.4)
RBC # BLD: 4.52 M/UL — SIGNIFICANT CHANGE UP (ref 3.8–5.2)
RBC # FLD: 14.7 % — HIGH (ref 10.3–14.5)
SODIUM SERPL-SCNC: 140 MMOL/L — SIGNIFICANT CHANGE UP (ref 135–145)
WBC # BLD: 8.17 K/UL — SIGNIFICANT CHANGE UP (ref 3.8–10.5)
WBC # FLD AUTO: 8.17 K/UL — SIGNIFICANT CHANGE UP (ref 3.8–10.5)

## 2023-06-29 PROCEDURE — 99232 SBSQ HOSP IP/OBS MODERATE 35: CPT

## 2023-06-29 PROCEDURE — 93010 ELECTROCARDIOGRAM REPORT: CPT

## 2023-06-29 PROCEDURE — 33208 INSRT HEART PM ATRIAL & VENT: CPT | Mod: LT

## 2023-06-29 PROCEDURE — 71045 X-RAY EXAM CHEST 1 VIEW: CPT | Mod: 26

## 2023-06-29 RX ORDER — DOXAZOSIN MESYLATE 4 MG
4 TABLET ORAL DAILY
Refills: 0 | Status: DISCONTINUED | OUTPATIENT
Start: 2023-06-29 | End: 2023-06-30

## 2023-06-29 RX ORDER — NIFEDIPINE 30 MG
90 TABLET, EXTENDED RELEASE 24 HR ORAL DAILY
Refills: 0 | Status: DISCONTINUED | OUTPATIENT
Start: 2023-06-29 | End: 2023-06-30

## 2023-06-29 RX ORDER — HYDRALAZINE HCL 50 MG
10 TABLET ORAL EVERY 4 HOURS
Refills: 0 | Status: DISCONTINUED | OUTPATIENT
Start: 2023-06-29 | End: 2023-06-30

## 2023-06-29 RX ORDER — HYDRALAZINE HCL 50 MG
10 TABLET ORAL ONCE
Refills: 0 | Status: COMPLETED | OUTPATIENT
Start: 2023-06-29 | End: 2023-06-29

## 2023-06-29 RX ORDER — CEFAZOLIN SODIUM 1 G
1000 VIAL (EA) INJECTION EVERY 8 HOURS
Refills: 0 | Status: COMPLETED | OUTPATIENT
Start: 2023-06-29 | End: 2023-06-30

## 2023-06-29 RX ORDER — ACETAMINOPHEN 500 MG
650 TABLET ORAL EVERY 6 HOURS
Refills: 0 | Status: DISCONTINUED | OUTPATIENT
Start: 2023-06-29 | End: 2023-06-30

## 2023-06-29 RX ORDER — CEFAZOLIN SODIUM 1 G
1000 VIAL (EA) INJECTION EVERY 8 HOURS
Refills: 0 | Status: DISCONTINUED | OUTPATIENT
Start: 2023-06-29 | End: 2023-06-29

## 2023-06-29 RX ORDER — CEFAZOLIN SODIUM 1 G
2000 VIAL (EA) INJECTION ONCE
Refills: 0 | Status: COMPLETED | OUTPATIENT
Start: 2023-06-29 | End: 2023-06-29

## 2023-06-29 RX ORDER — QUETIAPINE FUMARATE 200 MG/1
25 TABLET, FILM COATED ORAL AT BEDTIME
Refills: 0 | Status: DISCONTINUED | OUTPATIENT
Start: 2023-06-29 | End: 2023-06-30

## 2023-06-29 RX ADMIN — Medication 100 MILLIGRAM(S): at 11:55

## 2023-06-29 RX ADMIN — CHLORHEXIDINE GLUCONATE 1 APPLICATION(S): 213 SOLUTION TOPICAL at 09:24

## 2023-06-29 RX ADMIN — Medication 90 MILLIGRAM(S): at 16:10

## 2023-06-29 RX ADMIN — Medication 81 MILLIGRAM(S): at 15:07

## 2023-06-29 RX ADMIN — SIMVASTATIN 40 MILLIGRAM(S): 20 TABLET, FILM COATED ORAL at 21:06

## 2023-06-29 RX ADMIN — Medication 1000 MILLIGRAM(S): at 20:29

## 2023-06-29 RX ADMIN — Medication 10 MILLIGRAM(S): at 13:34

## 2023-06-29 NOTE — DIETITIAN INITIAL EVALUATION ADULT - NS FNS DIET ORDER
Diet, NPO:   NPO for Procedure/Test     NPO Start Date: 29-Jun-2023,   NPO Start Time: 00:01  Except Medications (06-28-23 @ 15:52)  Diet, NPO after Midnight:      NPO Start Date: 28-Jun-2023,   NPO Start Time: 23:59 (06-28-23 @ 14:35)  Diet, DASH/TLC:   Sodium & Cholesterol Restricted (06-28-23 @ 14:35)

## 2023-06-29 NOTE — PROGRESS NOTE ADULT - TIME BILLING
coordinating care with cardiology, ICU RN, reviewing labs and prior records, personally reviewing and interpreting imaging, documenting findings and assessment in the medical record.

## 2023-06-29 NOTE — DIETITIAN INITIAL EVALUATION ADULT - PERTINENT LABORATORY DATA
06-29    140  |  110<H>  |  19  ----------------------------<  93  3.5   |  24  |  0.84    Ca    8.6      29 Jun 2023 05:49  Phos  2.6     06-29  Mg     1.9     06-29    TPro  6.2  /  Alb  3.2<L>  /  TBili  0.4  /  DBili  x   /  AST  11<L>  /  ALT  14  /  AlkPhos  60  06-28

## 2023-06-29 NOTE — DIETITIAN INITIAL EVALUATION ADULT - OTHER INFO
89 y/o F with a PMHx of HTN, HLD, presented to ED after being seen in her PMD office and being found to be in CHB, in the 40s. Her BP is 110/70 and she is breathing comfortably. She admits to episodes of dizziness and lightheadedness recently. Unclear if she has had syncope. Admitted for complete atrioventricular block; tx to CCU. Plan for PPM today.    Unable to obtain meaningful information 2/2 pt appears confused at time of visit. RN obtained bedscale wt on 6/29 - 113#. No weight hx to review. Pt thin, frail appearing. Pt confused and ripping off medical gown at time of visit; RD unable to perform NFPE 2/2 agitation. RD visualized mild muscle/ fat wasting, pt meets criteria for PCM at this time. Recommend to advance diet to Regular d/t advanced age as soon as medically feasible. Will trial Ensure Plus High Protein BID in effort to optimize PO intake. Nutrition support is not recommended due to overall declining medical status which evidenced based studies indicate EN is not effective in prolonging survival and improving quality of life. It can also increase risk of aspiration pneumonia as well as other related issues. however will provide nutrition within GOC. Please see additional recommendations below.

## 2023-06-29 NOTE — DIETITIAN NUTRITION RISK NOTIFICATION - ADDITIONAL COMMENTS/DIETITIAN RECOMMENDATIONS
1) Advance diet to Regular d/t advanced age as soon as medically feasible  2) Add ensure plus high protein BID to optimize PO intake (provides 350 kcal, 20g protein/ shake)   3) Obtain vitamin D 25OH level to assess nutriture  4) Please obtain daily weights  5) Consider adding thiamine 100 mg daily 2/2 poor PO intake/ malnutrition  6) MVI w/ minerals daily to ensure 100% RDA met  7) Encourage protein-rich foods, maximize food preferences  8) Monitor bowel movements, if no BM for >3 days, consider implementing bowel regimen.  9) Confirm goals of care regarding nutrition support - Nutrition support is not recommended due to overall declining medical status which evidenced based studies indicate EN is not effective in prolonging survival and improving quality of life. It can also increase risk of aspiration pneumonia as well as other related issues. however will provide nutrition within GOC.   RD will continue to monitor PO intake, labs, hydration, and wt prn.

## 2023-06-29 NOTE — DIETITIAN INITIAL EVALUATION ADULT - PERTINENT MEDS FT
MEDICATIONS  (STANDING):  aspirin enteric coated 81 milliGRAM(s) Oral daily  ceFAZolin   IVPB 2000 milliGRAM(s) IV Intermittent once  chlorhexidine 4% Liquid 1 Application(s) Topical <User Schedule>  simvastatin 40 milliGRAM(s) Oral at bedtime    Home Medications:  aspirin 325 mg oral delayed release tablet: 1 tab(s) orally once a day (28 Jun 2023 15:13)  clopidogrel 75 mg oral tablet: 1 tab(s) orally once a day (28 Jun 2023 15:13)  doxazosin 4 mg oral tablet: 1 tab(s) orally once a day ***medication stopped as of yesterday PM*** (28 Jun 2023 18:25)  gabapentin 100 mg oral capsule: 1 cap(s) orally once a day (28 Jun 2023 15:12)  NIFEdipine 90 mg oral tablet, extended release: 1 tab(s) orally once a day (28 Jun 2023 15:12)  PreserVision AREDS Lutein oral capsule: 1 cap(s) orally once a day (28 Jun 2023 15:13)  simvastatin 20 mg oral tablet: 1 tab(s) orally once a day (at bedtime) (28 Jun 2023 18:25)  Vitamin D3 25 mcg (1000 intl units) oral capsule: 1 cap(s) orally once a day (28 Jun 2023 15:13)

## 2023-06-29 NOTE — DIETITIAN NUTRITION RISK NOTIFICATION - TREATMENT: THE FOLLOWING DIET HAS BEEN RECOMMENDED
Diet, NPO:   NPO for Procedure/Test     NPO Start Date: 29-Jun-2023,   NPO Start Time: 00:01  Except Medications (06-28-23 @ 15:52) [Active]  Diet, NPO after Midnight:      NPO Start Date: 28-Jun-2023,   NPO Start Time: 23:59 (06-28-23 @ 14:35) [Active]  Diet, DASH/TLC:   Sodium & Cholesterol Restricted (06-28-23 @ 14:35) [Active]

## 2023-06-29 NOTE — DIETITIAN INITIAL EVALUATION ADULT - MALNUTRITION
Pt meets criteria for moderate malnutrition in context of social/ environmental circumstances with aspects of acute illness

## 2023-06-29 NOTE — DIETITIAN INITIAL EVALUATION ADULT - NSFNSGIIOFT_GEN_A_CORE
I&O's Detail    28 Jun 2023 07:01  -  29 Jun 2023 07:00  --------------------------------------------------------  IN:  Total IN: 0 mL    OUT:    Voided (mL): 400 mL  Total OUT: 400 mL    Total NET: -400 mL

## 2023-06-30 ENCOUNTER — TRANSCRIPTION ENCOUNTER (OUTPATIENT)
Age: 88
End: 2023-06-30

## 2023-06-30 VITALS — HEART RATE: 80 BPM | SYSTOLIC BLOOD PRESSURE: 99 MMHG | RESPIRATION RATE: 23 BRPM | DIASTOLIC BLOOD PRESSURE: 84 MMHG

## 2023-06-30 LAB
ANION GAP SERPL CALC-SCNC: 6 MMOL/L — SIGNIFICANT CHANGE UP (ref 5–17)
BUN SERPL-MCNC: 21 MG/DL — SIGNIFICANT CHANGE UP (ref 7–23)
CALCIUM SERPL-MCNC: 8.3 MG/DL — LOW (ref 8.5–10.1)
CHLORIDE SERPL-SCNC: 110 MMOL/L — HIGH (ref 96–108)
CO2 SERPL-SCNC: 23 MMOL/L — SIGNIFICANT CHANGE UP (ref 22–31)
CREAT SERPL-MCNC: 0.92 MG/DL — SIGNIFICANT CHANGE UP (ref 0.5–1.3)
EGFR: 57 ML/MIN/1.73M2 — LOW
GLUCOSE SERPL-MCNC: 93 MG/DL — SIGNIFICANT CHANGE UP (ref 70–99)
HCT VFR BLD CALC: 37.5 % — SIGNIFICANT CHANGE UP (ref 34.5–45)
HGB BLD-MCNC: 12.4 G/DL — SIGNIFICANT CHANGE UP (ref 11.5–15.5)
MCHC RBC-ENTMCNC: 30.3 PG — SIGNIFICANT CHANGE UP (ref 27–34)
MCHC RBC-ENTMCNC: 33.1 GM/DL — SIGNIFICANT CHANGE UP (ref 32–36)
MCV RBC AUTO: 91.7 FL — SIGNIFICANT CHANGE UP (ref 80–100)
PLATELET # BLD AUTO: 221 K/UL — SIGNIFICANT CHANGE UP (ref 150–400)
POTASSIUM SERPL-MCNC: 3.2 MMOL/L — LOW (ref 3.5–5.3)
POTASSIUM SERPL-SCNC: 3.2 MMOL/L — LOW (ref 3.5–5.3)
RBC # BLD: 4.09 M/UL — SIGNIFICANT CHANGE UP (ref 3.8–5.2)
RBC # FLD: 14.8 % — HIGH (ref 10.3–14.5)
SODIUM SERPL-SCNC: 139 MMOL/L — SIGNIFICANT CHANGE UP (ref 135–145)
WBC # BLD: 8.81 K/UL — SIGNIFICANT CHANGE UP (ref 3.8–10.5)
WBC # FLD AUTO: 8.81 K/UL — SIGNIFICANT CHANGE UP (ref 3.8–10.5)

## 2023-06-30 PROCEDURE — 93010 ELECTROCARDIOGRAM REPORT: CPT

## 2023-06-30 PROCEDURE — 99239 HOSP IP/OBS DSCHRG MGMT >30: CPT

## 2023-06-30 RX ORDER — POTASSIUM CHLORIDE 20 MEQ
40 PACKET (EA) ORAL ONCE
Refills: 0 | Status: COMPLETED | OUTPATIENT
Start: 2023-06-30 | End: 2023-06-30

## 2023-06-30 RX ADMIN — Medication 1000 MILLIGRAM(S): at 05:47

## 2023-06-30 RX ADMIN — Medication 81 MILLIGRAM(S): at 09:39

## 2023-06-30 RX ADMIN — QUETIAPINE FUMARATE 25 MILLIGRAM(S): 200 TABLET, FILM COATED ORAL at 01:29

## 2023-06-30 RX ADMIN — Medication 4 MILLIGRAM(S): at 09:40

## 2023-06-30 RX ADMIN — Medication 90 MILLIGRAM(S): at 09:40

## 2023-06-30 RX ADMIN — Medication 40 MILLIEQUIVALENT(S): at 09:39

## 2023-06-30 RX ADMIN — Medication 650 MILLIGRAM(S): at 09:39

## 2023-06-30 NOTE — PROGRESS NOTE ADULT - NUTRITIONAL ASSESSMENT
This patient has been assessed with a concern for Malnutrition and has been determined to have a diagnosis/diagnoses of Moderate protein-calorie malnutrition.    This patient is being managed with:   Diet DASH/TLC-  Sodium & Cholesterol Restricted  Entered: Jun 28 2023  2:34PM  
This patient has been assessed with a concern for Malnutrition and has been determined to have a diagnosis/diagnoses of Moderate protein-calorie malnutrition.    This patient is being managed with:   Diet DASH/TLC-  Sodium & Cholesterol Restricted  Entered: Jun 28 2023  2:34PM

## 2023-06-30 NOTE — DISCHARGE NOTE PROVIDER - DETAILS OF MALNUTRITION DIAGNOSIS/DIAGNOSES
This patient has been assessed with a concern for Malnutrition and was treated during this hospitalization for the following Nutrition diagnosis/diagnoses:     -  06/29/2023: Moderate protein-calorie malnutrition

## 2023-06-30 NOTE — DISCHARGE NOTE PROVIDER - CARE PROVIDER_API CALL
Karl Carbajal.  Cardiovascular Disease  [address] [place] [address]  Phone: [phone]  Fax: [phone]  Established Patient  Follow Up Time:

## 2023-06-30 NOTE — DISCHARGE NOTE PROVIDER - NSDCMRMEDTOKEN_GEN_ALL_CORE_FT
aspirin 325 mg oral delayed release tablet: 1 tab(s) orally once a day  clopidogrel 75 mg oral tablet: 1 tab(s) orally once a day  doxazosin 4 mg oral tablet: 1 tab(s) orally once a day ***medication stopped as of yesterday PM***  gabapentin 100 mg oral capsule: 1 cap(s) orally once a day  NIFEdipine 90 mg oral tablet, extended release: 1 tab(s) orally once a day  PreserVision AREDS Lutein oral capsule: 1 cap(s) orally once a day  simvastatin 20 mg oral tablet: 1 tab(s) orally once a day (at bedtime)  Vitamin D3 25 mcg (1000 intl units) oral capsule: 1 cap(s) orally once a day

## 2023-06-30 NOTE — PHYSICAL THERAPY INITIAL EVALUATION ADULT - GENERAL OBSERVATIONS, REHAB EVAL
Pt was found sitting in chair in CCU with tele,BP cuff, pt having breakfast, pt is s/p PPM-stitches+, pt educated on PPM precs

## 2023-06-30 NOTE — PHYSICAL THERAPY INITIAL EVALUATION ADULT - PERTINENT HX OF CURRENT PROBLEM, REHAB EVAL
89F with a PmHx of HTN, HLD, presents after being seen in her PMD office and being found to be in CHB, in the 40s. Her BP is 110/70 and she is breathing comfortably. She admits to episodes of dizziness and lightheadedness recently. 89F with a PmHx of HTN, HLD, presents after being seen in her PMD office and being found to be in CHB, in the 40s. Her BP is 110/70 and she is breathing comfortably. She admits to episodes of dizziness and lightheadedness recently. Now s/p PPM

## 2023-06-30 NOTE — DISCHARGE NOTE PROVIDER - HOSPITAL COURSE
Patient admitted with complete heart block with weakness  underwent insertion of PPM  functioning appropriately  ready for discharge

## 2023-06-30 NOTE — PHYSICAL THERAPY INITIAL EVALUATION ADULT - LIVES WITH, PROFILE
1 step to enter, FOS to bedroom and bathroom/children dtr, grandson, 1 step to enter, FOS to bedroom and bathroom/children

## 2023-06-30 NOTE — PROGRESS NOTE ADULT - ASSESSMENT
89 yo female with hx of HTN, HLD, independent of ADLs, sent in to the hospital from PMD for complete heart block.    No complaints today.    Plan  - permanent pacemaker today  - NPO for procedure then can start diet  - probable discharge home tomorrow
ASSESSMENT AND PLAN  89yFemale POD#1, Status Post dual chamber pacemaker.  Patient denies any complaints of chest pain, SOB, dizziness, palpitations or any significant discomfort.      Antibiotics complete  CXR reviewed  Device interrogated this AM with normal function, A paced <1%, V paced 98%  Post operative instructions reviewed with verbalized understanding, written instructions provided  Oupatient EP follow up in two weeks or sooner for symptoms or concerns  K 3.2 this AM, will replete and give Potassium 40meq PO once  Patient is stable for discharge from EP standpoint  Dispo pending PT eval, per CCU team  Plan discussed with patient, CCU team and Dr. Carbajal             
Patient s/p PPM for CHB with lightheadedness  hemodynamically stable  PPM functioning   stable for d/c home  with f/u with EP  conitnue aspirin/plavix  nifedipine , statin

## 2023-06-30 NOTE — PROGRESS NOTE ADULT - SUBJECTIVE AND OBJECTIVE BOX
Events Overnight: comfortable, no distress, ppm working hemodynamically stable    HPI:      89F with a PmHx of HTN, HLD, presents after being seen in her PMD office and being found to be in CHB, in the 40s. Her BP is 110/70 and she is breathing comfortably. She had  to episodes of dizziness and lightheadedness recently. Unclear if she has had syncope.    had ppm placed 6/29,    No history of Meche blocking agents, was on NIfedipine, ASA/Plavix  (28 Jun 2023 14:15)    PAST MEDICAL & SURGICAL HISTORY:  HTN (hypertension)  Age-related macular degeneration  H/O elbow surgery  Coccyx pain  treated with removal    PMH:        as above    ROS no chest pain, no  fevers, no chills, no abdominal pain, no chest pain, no sob, slight pain, near ppm site      MEDICATIONS  (STANDING):  aspirin enteric coated 81 milliGRAM(s) Oral daily  chlorhexidine 4% Liquid 1 Application(s) Topical <User Schedule>  doxazosin 4 milliGRAM(s) Oral daily  NIFEdipine XL 90 milliGRAM(s) Oral daily  simvastatin 40 milliGRAM(s) Oral at bedtime    MEDICATIONS  (PRN):  acetaminophen     Tablet .. 650 milliGRAM(s) Oral every 6 hours PRN Mild Pain (1 - 3), Moderate Pain (4 - 6)  hydrALAZINE Injectable 10 milliGRAM(s) IV Push every 4 hours PRN SBP > 180  QUEtiapine 25 milliGRAM(s) Oral at bedtime PRN Insomnia    ICU Vital Signs Last 24 Hrs  T(C): 37.2 (30 Jun 2023 05:04), Max: 37.2 (30 Jun 2023 00:38)  T(F): 98.9 (30 Jun 2023 05:04), Max: 99 (30 Jun 2023 00:38)  HR: 98 (30 Jun 2023 09:00) (69 - 107)  BP: 126/78 (30 Jun 2023 09:00) (87/70 - 225/90)  BP(mean): 63 (30 Jun 2023 08:00) (62 - 120)  ABP: --  ABP(mean): --  RR: 22 (30 Jun 2023 09:00) (14 - 25)  SpO2: 93% (30 Jun 2023 01:00) (92% - 100%)    O2 Parameters below as of 29 Jun 2023 19:04  Patient On (Oxygen Delivery Method): room air    Physical Exam    General - no distress, frail  Neuro awake alert appropriate  neck supple  lungs clear  cv rrr, no murmur  abdomen soft,  extremities no edema  chest pacemeaker site minimal edema    I&O's Summary    29 Jun 2023 07:01  -  30 Jun 2023 07:00  --------------------------------------------------------  IN: 0 mL / OUT: 1050 mL / NET: -1050 mL                        12.4   8.81  )-----------( 221      ( 30 Jun 2023 05:51 )             37.5       06-30    139  |  110<H>  |  21  ----------------------------<  93  3.2<L>   |  23  |  0.92    Ca    8.3<L>      30 Jun 2023 05:51  Phos  2.6     06-29  Mg     1.9     06-29    TPro  6.2  /  Alb  3.2<L>  /  TBili  0.4  /  DBili  x   /  AST  11<L>  /  ALT  14  /  AlkPhos  60  06-28    Urinalysis Basic - ( 30 Jun 2023 05:51 )    Color: x / Appearance: x / SG: x / pH: x  Gluc: 93 mg/dL / Ketone: x  / Bili: x / Urobili: x   Blood: x / Protein: x / Nitrite: x   Leuk Esterase: x / RBC: x / WBC x   Sq Epi: x / Non Sq Epi: x / Bacteria: x      DVT Prophylaxis:   ambulate                                                              Advanced Directives: Full Code                     
HPI:  89F with a PmHx of HTN, HLD, presents after being seen in her PMD office and being found to be in CHB, in the 40s. Her BP is 110/70 and she is breathing comfortably. She admits to episodes of dizziness and lightheadedness recently. Unclear if she has had syncope.  Denies chest pain. sitting in bed now without symptoms.  No history of Meche blocking agents, was on NIfedipine, ASA/Plavix  (28 Jun 2023 14:15)    6/30/23: Patient s/p dual chamber PPM POD #1 for CHB.   Patient seen at bedside, no events noted overnight. Denies any CP, palpitations, SOB, dizziness, lightheadedness, significant pain at site.  TELE: SR, v paced  EKG: Asensed, vpaced@88bpm  WI: 190ms  QRS: 146ms  QT/QTc: 448/543ms    MEDICATIONS  (STANDING):  aspirin enteric coated 81 milliGRAM(s) Oral daily  chlorhexidine 4% Liquid 1 Application(s) Topical <User Schedule>  doxazosin 4 milliGRAM(s) Oral daily  NIFEdipine XL 90 milliGRAM(s) Oral daily  simvastatin 40 milliGRAM(s) Oral at bedtime    MEDICATIONS  (PRN):  acetaminophen     Tablet .. 650 milliGRAM(s) Oral every 6 hours PRN Mild Pain (1 - 3), Moderate Pain (4 - 6)  hydrALAZINE Injectable 10 milliGRAM(s) IV Push every 4 hours PRN SBP > 180  QUEtiapine 25 milliGRAM(s) Oral at bedtime PRN Insomnia      Vital Signs Last 24 Hrs  T(C): 37.2 (30 Jun 2023 05:04), Max: 37.2 (30 Jun 2023 00:38)  T(F): 98.9 (30 Jun 2023 05:04), Max: 99 (30 Jun 2023 00:38)  HR: 83 (30 Jun 2023 08:00) (44 - 107)  BP: 92/55 (30 Jun 2023 08:00) (87/70 - 225/90)  BP(mean): 63 (30 Jun 2023 08:00) (62 - 120)  RR: 18 (30 Jun 2023 08:00) (14 - 25)  SpO2: 93% (30 Jun 2023 01:00) (92% - 100%)    Parameters below as of 29 Jun 2023 19:04  Patient On (Oxygen Delivery Method): room air        PHYSICAL EXAMINATION:  GENERAL: In no apparent distress  HEART: Regular rate and rhythm; No murmurs, rubs, or gallops. Left chest subclavicular incision with prineo in place.  No erythema, drainage or hematoma noted.  Some mild ecchymosis noted at site.  PULMONARY: Clear to auscultation and perfusion.  No rales, wheezing, or rhonchi bilaterally.  ABDOMEN: Soft, Nontender, Nondistended; Bowel sounds present  EXTREMITIES:  2+ Peripheral Pulses, No clubbing, cyanosis, or edema  LYMPH: No lymphadenopathy noted  NEUROLOGICAL: Grossly nonfocal    Labs:  06-30    139  |  110<H>  |  21  ----------------------------<  93  3.2<L>   |  23  |  0.92    Ca    8.3<L>      30 Jun 2023 05:51  Phos  2.6     06-29  Mg     1.9     06-29    TPro  6.2  /  Alb  3.2<L>  /  TBili  0.4  /  DBili  x   /  AST  11<L>  /  ALT  14  /  AlkPhos  60  06-28                          12.4   8.81  )-----------( 221      ( 30 Jun 2023 05:51 )             37.5       Radiology:  < from: Xray Chest 1 View- PORTABLE-Urgent (06.29.23 @ 14:05) >  FINDINGS:  An AP portable chest radiograph demonstrates chronic linear scarring at   the left lung base, unchanged from the prior exam while the remaining   lungs are clear. No infiltrates are seen. There is no pneumothorax. There   are no pleural effusions. There is no hilar or mediastinal widening. The   cardiac silhouette is prominent with AV sequential pacemaker. The right   ventricular component is directed superiorly. There is no pulmonary   edema. The aorta is atherosclerotic. There are degenerative changes of   the spine and shoulders. Carotid artery calcifications are present on the   right greater than left sides.    IMPRESSION:  1. AV sequential pacemaker as described above.  2. Cardiomegaly, atherosclerotic aorta without pulmonary edema.  3. Chronic linear scarring at the left lung base, unchanged.        
No major events overnight  Remains hemodynamically stable    Alert, nad, polite and cooperative   bradycardic   lungs clear  abd soft, nontender    On Admission  06-28-23 (1d)  HPI:  89F with a PmHx of HTN, HLD, presents after being seen in her PMD office and being found to be in CHB, in the 40s. Her BP is 110/70 and she is breathing comfortably. She admits to episodes of dizziness and lightheadedness recently. Unclear if she has had syncope.  Denies chest pain. sitting in bed now without symptoms     No history of Meche blocking agents, was on NIfedipine, ASA/Plavix  (28 Jun 2023 14:15)    PAST MEDICAL & SURGICAL HISTORY:  HTN (hypertension)      Age-related macular degeneration      H/O elbow surgery      Coccyx pain  treated with removal          Antimicrobial:  ceFAZolin  Injectable. 1000 milliGRAM(s) IV Push every 8 hours    Cardiovascular:    Pulmonary:    Hematalogic:  aspirin enteric coated 81 milliGRAM(s) Oral daily    Other:  acetaminophen     Tablet .. 650 milliGRAM(s) Oral every 6 hours PRN  chlorhexidine 4% Liquid 1 Application(s) Topical <User Schedule>  simvastatin 40 milliGRAM(s) Oral at bedtime      Drug Dosing Weight  Height (cm): 157.5 (28 Jun 2023 12:46)  Weight (kg): 51.7 (28 Jun 2023 12:46)  BMI (kg/m2): 20.8 (28 Jun 2023 12:46)  BSA (m2): 1.51 (28 Jun 2023 12:46)    T(C): 36.8 (06-29-23 @ 10:15), Max: 36.8 (06-29-23 @ 05:02)  HR: 85 (06-29-23 @ 13:30)  BP: 225/90 (06-29-23 @ 13:30)  BP(mean): 120 (06-29-23 @ 13:30)  ABP: --  ABP(mean): --  RR: 14 (06-29-23 @ 13:30)  SpO2: 98% (06-29-23 @ 13:30)          06-28 @ 07:01  -  06-29 @ 07:00  --------------------------------------------------------  IN: 0 mL / OUT: 400 mL / NET: -400 mL              LABS:  CBC Full  -  ( 29 Jun 2023 05:49 )  WBC Count : 8.17 K/uL  RBC Count : 4.52 M/uL  Hemoglobin : 13.6 g/dL  Hematocrit : 41.6 %  Platelet Count - Automated : 241 K/uL  Mean Cell Volume : 92.0 fl  Mean Cell Hemoglobin : 30.1 pg  Mean Cell Hemoglobin Concentration : 32.7 gm/dL  Auto Neutrophil # : x  Auto Lymphocyte # : x  Auto Monocyte # : x  Auto Eosinophil # : x  Auto Basophil # : x  Auto Neutrophil % : x  Auto Lymphocyte % : x  Auto Monocyte % : x  Auto Eosinophil % : x  Auto Basophil % : x    06-29    140  |  110<H>  |  19  ----------------------------<  93  3.5   |  24  |  0.84    Ca    8.6      29 Jun 2023 05:49  Phos  2.6     06-29  Mg     1.9     06-29    TPro  6.2  /  Alb  3.2<L>  /  TBili  0.4  /  DBili  x   /  AST  11<L>  /  ALT  14  /  AlkPhos  60  06-28    PT/INR - ( 29 Jun 2023 05:49 )   PT: 12.4 sec;   INR: 1.07 ratio         PTT - ( 29 Jun 2023 05:49 )  PTT:32.0 sec  Urinalysis Basic - ( 29 Jun 2023 05:49 )    Color: x / Appearance: x / SG: x / pH: x  Gluc: 93 mg/dL / Ketone: x  / Bili: x / Urobili: x   Blood: x / Protein: x / Nitrite: x   Leuk Esterase: x / RBC: x / WBC x   Sq Epi: x / Non Sq Epi: x / Bacteria: x        ____________________________________________________________________________________________________

## 2023-06-30 NOTE — DISCHARGE NOTE NURSING/CASE MANAGEMENT/SOCIAL WORK - PATIENT PORTAL LINK FT
You can access the FollowMyHealth Patient Portal offered by [facility] [place] by registering at the following website: [email or web address]. By joining [employer]’s FollowMyHealth portal, you will also be able to view your health information using other applications (apps) compatible with our system.

## 2023-07-13 ENCOUNTER — APPOINTMENT (OUTPATIENT)
Dept: ELECTROPHYSIOLOGY | Facility: CLINIC | Age: 88
End: 2023-07-13
Payer: MEDICARE

## 2023-07-13 ENCOUNTER — NON-APPOINTMENT (OUTPATIENT)
Age: 88
End: 2023-07-13

## 2023-07-13 VITALS
DIASTOLIC BLOOD PRESSURE: 70 MMHG | BODY MASS INDEX: 20.43 KG/M2 | OXYGEN SATURATION: 99 % | SYSTOLIC BLOOD PRESSURE: 101 MMHG | WEIGHT: 111 LBS | HEART RATE: 79 BPM | HEIGHT: 62 IN

## 2023-07-13 DIAGNOSIS — I44.2 ATRIOVENTRICULAR BLOCK, COMPLETE: ICD-10-CM

## 2023-07-13 PROCEDURE — 99024 POSTOP FOLLOW-UP VISIT: CPT

## 2023-07-13 PROCEDURE — 93280 PM DEVICE PROGR EVAL DUAL: CPT

## 2023-07-20 NOTE — ASU PREOP CHECKLIST - AS BP NONINV SITE
left upper arm
What Type Of Note Output Would You Prefer (Optional)?: Bullet Format
Is The Patient Presenting As Previously Scheduled?: Yes
How Severe Is Your Rash?: mild
Is This A New Presentation, Or A Follow-Up?: Rash

## 2023-10-12 ENCOUNTER — APPOINTMENT (OUTPATIENT)
Dept: ELECTROPHYSIOLOGY | Facility: CLINIC | Age: 88
End: 2023-10-12
Payer: MEDICARE

## 2023-10-13 ENCOUNTER — NON-APPOINTMENT (OUTPATIENT)
Age: 88
End: 2023-10-13

## 2023-10-14 PROCEDURE — 93296 REM INTERROG EVL PM/IDS: CPT

## 2023-10-14 PROCEDURE — 93294 REM INTERROG EVL PM/LDLS PM: CPT

## 2023-11-15 ENCOUNTER — NON-APPOINTMENT (OUTPATIENT)
Age: 88
End: 2023-11-15

## 2023-11-15 ENCOUNTER — APPOINTMENT (OUTPATIENT)
Dept: NEUROLOGY | Facility: CLINIC | Age: 88
End: 2023-11-15
Payer: MEDICARE

## 2023-11-15 VITALS
WEIGHT: 109 LBS | HEART RATE: 70 BPM | DIASTOLIC BLOOD PRESSURE: 72 MMHG | SYSTOLIC BLOOD PRESSURE: 138 MMHG | HEIGHT: 61 IN | BODY MASS INDEX: 20.58 KG/M2 | TEMPERATURE: 98.2 F

## 2023-11-15 DIAGNOSIS — Z78.9 OTHER SPECIFIED HEALTH STATUS: ICD-10-CM

## 2023-11-15 DIAGNOSIS — H35.30 UNSPECIFIED MACULAR DEGENERATION: ICD-10-CM

## 2023-11-15 DIAGNOSIS — M54.16 RADICULOPATHY, LUMBAR REGION: ICD-10-CM

## 2023-11-15 DIAGNOSIS — R42 DIZZINESS AND GIDDINESS: ICD-10-CM

## 2023-11-15 DIAGNOSIS — R41.89 OTHER SYMPTOMS AND SIGNS INVOLVING COGNITIVE FUNCTIONS AND AWARENESS: ICD-10-CM

## 2023-11-15 DIAGNOSIS — I10 ESSENTIAL (PRIMARY) HYPERTENSION: ICD-10-CM

## 2023-11-15 PROCEDURE — 99204 OFFICE O/P NEW MOD 45 MIN: CPT

## 2023-11-15 RX ORDER — NIFEDIPINE 90 MG
90 TABLET, EXTENDED RELEASE ORAL DAILY
Refills: 0 | Status: ACTIVE | COMMUNITY

## 2023-11-15 RX ORDER — DOXAZOSIN 4 MG/1
4 TABLET ORAL DAILY
Refills: 0 | Status: ACTIVE | COMMUNITY

## 2023-11-15 RX ORDER — SIMVASTATIN 20 MG/1
20 TABLET, FILM COATED ORAL DAILY
Refills: 0 | Status: ACTIVE | COMMUNITY

## 2023-11-15 RX ORDER — CLOPIDOGREL 75 MG/1
75 TABLET, FILM COATED ORAL DAILY
Refills: 0 | Status: ACTIVE | COMMUNITY

## 2023-11-15 RX ORDER — ASPIRIN 325 MG/1
325 TABLET, FILM COATED ORAL DAILY
Refills: 0 | Status: ACTIVE | COMMUNITY

## 2023-11-15 RX ORDER — ELECTROLYTES/DEXTROSE
SOLUTION, ORAL ORAL
Refills: 0 | Status: ACTIVE | COMMUNITY

## 2023-11-15 RX ORDER — MULTIVIT-MIN/FOLIC/VIT K/LYCOP 400-300MCG
25 MCG TABLET ORAL
Refills: 0 | Status: ACTIVE | COMMUNITY

## 2023-11-15 RX ORDER — GABAPENTIN 100 MG
100 TABLET ORAL DAILY
Refills: 0 | Status: ACTIVE | COMMUNITY

## 2023-11-15 RX ORDER — VIT C/E/ZN/COPPR/LUTEIN/ZEAXAN 250MG-90MG
CAPSULE ORAL
Refills: 0 | Status: ACTIVE | COMMUNITY

## 2023-11-20 DIAGNOSIS — E53.8 DEFICIENCY OF OTHER SPECIFIED B GROUP VITAMINS: ICD-10-CM

## 2023-11-20 LAB
FOLATE SERPL-MCNC: 4.6 NG/ML
TSH SERPL-ACNC: 2.21 UIU/ML
VIT B12 SERPL-MCNC: 505 PG/ML

## 2023-11-20 RX ORDER — CYANOCOBALAMIN/FOLIC ACID 1MG-400MCG
1000-400 TABLET, SUBLINGUAL SUBLINGUAL
Qty: 90 | Refills: 0 | Status: ACTIVE | COMMUNITY
Start: 2023-11-20 | End: 1900-01-01

## 2023-12-08 ENCOUNTER — TRANSCRIPTION ENCOUNTER (OUTPATIENT)
Age: 88
End: 2023-12-08

## 2024-01-09 ENCOUNTER — OUTPATIENT (OUTPATIENT)
Dept: OUTPATIENT SERVICES | Facility: HOSPITAL | Age: 89
LOS: 1 days | End: 2024-01-09
Payer: MEDICARE

## 2024-01-09 DIAGNOSIS — R42 DIZZINESS AND GIDDINESS: ICD-10-CM

## 2024-01-09 DIAGNOSIS — I65.23 OCCLUSION AND STENOSIS OF BILATERAL CAROTID ARTERIES: ICD-10-CM

## 2024-01-09 PROCEDURE — 70547 MR ANGIOGRAPHY NECK W/O DYE: CPT | Mod: 26

## 2024-01-09 PROCEDURE — 70547 MR ANGIOGRAPHY NECK W/O DYE: CPT

## 2024-01-09 PROCEDURE — 71046 X-RAY EXAM CHEST 2 VIEWS: CPT

## 2024-01-09 PROCEDURE — 70544 MR ANGIOGRAPHY HEAD W/O DYE: CPT

## 2024-01-09 PROCEDURE — 70551 MRI BRAIN STEM W/O DYE: CPT

## 2024-01-09 PROCEDURE — 70544 MR ANGIOGRAPHY HEAD W/O DYE: CPT | Mod: 26,XU

## 2024-01-09 PROCEDURE — 71046 X-RAY EXAM CHEST 2 VIEWS: CPT | Mod: 26

## 2024-01-09 PROCEDURE — 70551 MRI BRAIN STEM W/O DYE: CPT | Mod: 26

## 2024-01-10 DIAGNOSIS — I65.23 OCCLUSION AND STENOSIS OF BILATERAL CAROTID ARTERIES: ICD-10-CM

## 2024-01-10 DIAGNOSIS — R42 DIZZINESS AND GIDDINESS: ICD-10-CM

## 2024-01-11 ENCOUNTER — APPOINTMENT (OUTPATIENT)
Dept: ELECTROPHYSIOLOGY | Facility: CLINIC | Age: 89
End: 2024-01-11
Payer: MEDICARE

## 2024-01-12 ENCOUNTER — NON-APPOINTMENT (OUTPATIENT)
Age: 89
End: 2024-01-12

## 2024-01-12 PROCEDURE — 93294 REM INTERROG EVL PM/LDLS PM: CPT

## 2024-01-12 PROCEDURE — 93296 REM INTERROG EVL PM/IDS: CPT

## 2024-01-15 ENCOUNTER — RX RENEWAL (OUTPATIENT)
Age: 89
End: 2024-01-15

## 2024-01-22 ENCOUNTER — APPOINTMENT (OUTPATIENT)
Dept: NEUROLOGY | Facility: CLINIC | Age: 89
End: 2024-01-22
Payer: MEDICARE

## 2024-01-22 DIAGNOSIS — I67.2 CEREBRAL ATHEROSCLEROSIS: ICD-10-CM

## 2024-01-22 DIAGNOSIS — I65.23 OCCLUSION AND STENOSIS OF BILATERAL CAROTID ARTERIES: ICD-10-CM

## 2024-01-22 DIAGNOSIS — F03.918 UNSPECIFIED DEMENTIA, UNSPECIFIED SEVERITY, WITH OTHER BEHAVIORAL DISTURBANCE: ICD-10-CM

## 2024-01-22 DIAGNOSIS — M48.061 SPINAL STENOSIS, LUMBAR REGION WITHOUT NEUROGENIC CLAUDICATION: ICD-10-CM

## 2024-01-22 PROCEDURE — 99214 OFFICE O/P EST MOD 30 MIN: CPT | Mod: 95

## 2024-01-22 NOTE — HISTORY OF PRESENT ILLNESS
[FreeTextEntry1] : Patient for telehealth follow-up visit, I had called patient's daughter Jeanne a week ago regarding MRI and MRA brain results, Jeanne indicated that they do not want any aggressive therapy, given patient's age, I recommended continuing aspirin and clopidogrel and increasing simvastatin to 40 Mg daily.  Patient's daughter Jeanne reported that patient had been getting very aggressive at home, is mean and nasty to the daughter who lives with her, at times she has been getting physically aggressive making the daughter cry, hence we arranged for a urgent telehealth visit.  Upon inquiring and questioning the patient today, she is accompanied by her healthcare aide Ally Juarez, patient is very pleasant and interactive, she does report that she sleeps poorly, has disrupted sleep and wakes up every few hours, she also reports that she has left-sided pain which irritates her and she gets very restless and irritable, she does admit that sometimes she gets agitated and then goes and stays in her room.  Patient did admit that she yells at her daughter sometimes, as per the Ally, there is sometimes back-and-forth verbal discord between the daughter and the patient.  Patient has 2 hourly health care aides 3 days a week.  Patient is otherwise well, she has not noted any significant change in her cognition

## 2024-01-22 NOTE — DISCUSSION/SUMMARY
[FreeTextEntry1] : 89-year-old F with PMHx of CAD, HTN, spinal stenosis, b/l carotid stenosis, macular degeneration, PPM placement in 6/2023 presents accompanied by her 2 daughters for evaluation of mildly progressive cognitive decline, intermittent dizziness and low back pains radiating to legs.  # Mild dementia senile type, with behavioral changes and sleep issues  - Have recommended trial with Seroquel 12.5 Mg at bedtime, will help with mood as well as with sleep. - Cognitive testing in 12-14 weeks  # Moderate to severe plaque with stenosis of left proximal ICA, sluggish flow in the left vertebral artery and left M1 segment focal stenosis. Mild to moderate stenosis of right ICA.  - continue aspirin and clopidogrel, in addition increase simvastatin to 40 mg. As per patient's daughter, patient has been aware of carotid stenosis, they do not wish any aggressive management   # Dizziness could be vestibular dysfunction, however in view of multiple risk factors including recent PPM , no evidence of vertebrobasilar ischemia.  # lumbar DJD-stenosis with some radicular symptoms   - Start physical therapy for low back and legs and balance and gait training

## 2024-01-22 NOTE — DATA REVIEWED
[de-identified] : 1/9/24: MRI brain reveals chronic bilateral basal ganglia lacunar infarcts, no acute infarct or mass lesion. MRA head and neck reveals moderate to severe plaque with stenosis of left proximal ICA, in addition sluggish flow in the left vertebral artery and left M1 segment focal stenosis. Mild to moderate stenosis of right ICA. [de-identified] : 11/16/23: B12 505, folate 4.6 6/28/2023: CT scan head: No acute intracranial hemorrhage or mass effect.  Chronic white matter microvascular ischemic changes

## 2024-01-22 NOTE — REASON FOR VISIT
[Home] : at home, [unfilled] , at the time of the visit. [Medical Office: ([facility])___] : at the medical office located in  [Formal Caregiver] : formal caregiver [Patient] : the patient [FreeTextEntry2] : Jeanne, daughter [Follow-Up: _____] : a [unfilled] follow-up visit [FreeTextEntry1] : Dementia with some behavioral changes

## 2024-01-22 NOTE — PHYSICAL EXAM
[General Appearance - Alert] : alert [General Appearance - In No Acute Distress] : in no acute distress [General Appearance - Well-Appearing] : healthy appearing [FreeTextEntry1] : Interactive, cooperative [Person] : oriented to person [Place] : disoriented to place [Time] : disoriented to time [Short Term Intact] : short term memory impaired [Registration Intact] : recent registration memory intact [Span Intact] : the attention span was normal [Concentration Intact] : normal concentrating ability [Naming Objects] : no difficulty naming common objects [Repeating Phrases] : no difficulty repeating a phrase [Fluency] : fluency intact [Comprehension] : comprehension intact [Current Events] : inadequate knowledge of current events [Past History] : adequate knowledge of personal past history [Cranial Nerves Optic (II)] : visual acuity intact bilaterally,  visual fields full to confrontation, pupils equal round and reactive to light [Cranial Nerves Oculomotor (III)] : extraocular motion intact [Cranial Nerves Facial (VII)] : face symmetrical [Cranial Nerves Vestibulocochlear (VIII)] : hearing was intact bilaterally [Cranial Nerves Accessory (XI - Cranial And Spinal)] : head turning and shoulder shrug symmetric [Cranial Nerves Hypoglossal (XII)] : there was no tongue deviation with protrusion [FreeTextEntry4] : .Patient oriented to self, knows where she lives, does not recall the month year or season, has no recollection of current or past presidents, but is very appropriate and cognizant of where she lives in addition to her orientation of family members [FreeTextEntry6] : Limited virtual motor examination [FreeTextEntry8] : Gait: Antalgic [PERRL With Normal Accommodation] : pupils were equal in size, round, reactive to light, with normal accommodation [Extraocular Movements] : extraocular movements were intact [Full Visual Field] : full visual field

## 2024-01-22 NOTE — REVIEW OF SYSTEMS
[Feeling Tired] : feeling tired [Memory Lapses or Loss] : memory loss [Decr. Concentrating Ability] : decreased concentrating ability [Repeating Questions] : repeated questioning about recent events [Poor Coordination] : poor coordination [Dizziness] : dizziness [Tension Headache] : tension-type headaches [Sleep Disturbances] : sleep disturbances [Anxiety] : anxiety [Eye Pain] : eye pain [Shortness Of Breath] : no shortness of breath [Negative] : Endocrine [de-identified] : Agitated, at times disruptive (per daughter) [FreeTextEntry5] : S/P PPM [FreeTextEntry9] : LBP

## 2024-03-27 ENCOUNTER — NON-APPOINTMENT (OUTPATIENT)
Age: 89
End: 2024-03-27

## 2024-04-11 ENCOUNTER — APPOINTMENT (OUTPATIENT)
Dept: ELECTROPHYSIOLOGY | Facility: CLINIC | Age: 89
End: 2024-04-11
Payer: MEDICARE

## 2024-04-12 ENCOUNTER — NON-APPOINTMENT (OUTPATIENT)
Age: 89
End: 2024-04-12

## 2024-04-12 PROCEDURE — 93296 REM INTERROG EVL PM/IDS: CPT

## 2024-04-12 PROCEDURE — 93294 REM INTERROG EVL PM/LDLS PM: CPT

## 2024-04-18 NOTE — ED PROVIDER NOTE - CAD[facility] POA PRESS ULCER
From: Khushboo BOND Person  To: Charmaine Moe  Sent: 4/17/2024 5:05 PM CDT  Subject: Right knee    Dr Moe   It has been about a month now since RFA. Should I be feeling better by now? Because I am not. I am about the same before we started this treatment. I was so hopeful! This leaves me sad and depressed. Is there anything else that might be an option? Even at rest it is sore and I cannot get comfortable to sleep. I don’t see you until August unless I have other options or wait it out yet. Hope you have good news.  Thank you  Giselle Person  [phone]   Samantha Weaver Rn No

## 2024-05-20 ENCOUNTER — RX RENEWAL (OUTPATIENT)
Age: 89
End: 2024-05-20

## 2024-05-20 RX ORDER — FOLIC ACID 1 MG/1
1 TABLET ORAL
Qty: 30 | Refills: 1 | Status: ACTIVE | COMMUNITY
Start: 2023-11-20 | End: 1900-01-01

## 2024-06-17 ENCOUNTER — RX RENEWAL (OUTPATIENT)
Age: 89
End: 2024-06-17

## 2024-06-17 RX ORDER — QUETIAPINE FUMARATE 25 MG/1
25 TABLET ORAL
Qty: 45 | Refills: 0 | Status: ACTIVE | COMMUNITY
Start: 2024-01-22 | End: 1900-01-01

## 2024-07-18 ENCOUNTER — APPOINTMENT (OUTPATIENT)
Dept: ELECTROPHYSIOLOGY | Facility: CLINIC | Age: 89
End: 2024-07-18

## 2024-07-24 ENCOUNTER — APPOINTMENT (OUTPATIENT)
Dept: NEUROLOGY | Facility: CLINIC | Age: 89
End: 2024-07-24
Payer: MEDICARE

## 2024-07-24 VITALS
DIASTOLIC BLOOD PRESSURE: 77 MMHG | SYSTOLIC BLOOD PRESSURE: 160 MMHG | HEIGHT: 61 IN | HEART RATE: 80 BPM | BODY MASS INDEX: 20.58 KG/M2 | WEIGHT: 109 LBS

## 2024-07-24 DIAGNOSIS — M54.16 RADICULOPATHY, LUMBAR REGION: ICD-10-CM

## 2024-07-24 DIAGNOSIS — F03.918 UNSPECIFIED DEMENTIA, UNSPECIFIED SEVERITY, WITH OTHER BEHAVIORAL DISTURBANCE: ICD-10-CM

## 2024-07-24 PROCEDURE — 96132 NRPSYC TST EVAL PHYS/QHP 1ST: CPT | Mod: 59

## 2024-07-24 PROCEDURE — 99214 OFFICE O/P EST MOD 30 MIN: CPT | Mod: 25

## 2024-07-24 PROCEDURE — 96138 PSYCL/NRPSYC TECH 1ST: CPT | Mod: 59

## 2024-07-24 RX ORDER — DONEPEZIL HYDROCHLORIDE 5 MG/1
5 TABLET ORAL DAILY
Qty: 90 | Refills: 1 | Status: ACTIVE | COMMUNITY
Start: 2024-07-24 | End: 1900-01-01

## 2024-07-24 NOTE — REVIEW OF SYSTEMS
[Feeling Tired] : feeling tired [Memory Lapses or Loss] : memory loss [Decr. Concentrating Ability] : decreased concentrating ability [Repeating Questions] : repeated questioning about recent events [Poor Coordination] : poor coordination [Dizziness] : dizziness [Tension Headache] : tension-type headaches [Sleep Disturbances] : sleep disturbances [Anxiety] : anxiety [Eye Pain] : eye pain [Negative] : Endocrine

## 2024-07-24 NOTE — PHYSICAL EXAM
[General Appearance - Alert] : alert [General Appearance - In No Acute Distress] : in no acute distress [General Appearance - Well-Appearing] : healthy appearing [Person] : oriented to person [Registration Intact] : recent registration memory intact [Span Intact] : the attention span was normal [Concentration Intact] : normal concentrating ability [Naming Objects] : no difficulty naming common objects [Repeating Phrases] : no difficulty repeating a phrase [Fluency] : fluency intact [Comprehension] : comprehension intact [Past History] : adequate knowledge of personal past history [Cranial Nerves Optic (II)] : visual acuity intact bilaterally,  visual fields full to confrontation, pupils equal round and reactive to light [Cranial Nerves Oculomotor (III)] : extraocular motion intact [Cranial Nerves Trigeminal (V)] : facial sensation intact symmetrically [Cranial Nerves Facial (VII)] : face symmetrical [Cranial Nerves Vestibulocochlear (VIII)] : hearing was intact bilaterally [Cranial Nerves Glossopharyngeal (IX)] : tongue and palate midline [Cranial Nerves Accessory (XI - Cranial And Spinal)] : head turning and shoulder shrug symmetric [Cranial Nerves Hypoglossal (XII)] : there was no tongue deviation with protrusion [Motor Tone] : muscle tone was normal in all four extremities [Motor Strength] : muscle strength was normal in all four extremities [No Muscle Atrophy] : normal bulk in all four extremities [Sensation Tactile Decrease] : light touch was intact [2+] : Brachioradialis left 2+ [1+] : Patella left 1+ [0] : Ankle jerk left 0

## 2024-08-10 ENCOUNTER — RX RENEWAL (OUTPATIENT)
Age: 89
End: 2024-08-10

## 2024-09-25 NOTE — ED PROVIDER NOTE - COVID-19 ORDERING FACILITY
Bleeding that does not stop/Fever greater than (need to indicate Fahrenheit or Celsius)/Wound/Surgical Site with redness, or foul smelling discharge or pus
[facility]

## 2024-10-17 ENCOUNTER — APPOINTMENT (OUTPATIENT)
Dept: ELECTROPHYSIOLOGY | Facility: CLINIC | Age: 89
End: 2024-10-17

## 2024-10-28 ENCOUNTER — NON-APPOINTMENT (OUTPATIENT)
Age: 89
End: 2024-10-28

## 2024-10-28 ENCOUNTER — APPOINTMENT (OUTPATIENT)
Dept: ELECTROPHYSIOLOGY | Facility: CLINIC | Age: 89
End: 2024-10-28
Payer: MEDICARE

## 2024-10-28 PROCEDURE — 93294 REM INTERROG EVL PM/LDLS PM: CPT

## 2024-10-28 PROCEDURE — 93296 REM INTERROG EVL PM/IDS: CPT

## 2024-12-06 ENCOUNTER — RX RENEWAL (OUTPATIENT)
Age: 88
End: 2024-12-06

## 2024-12-18 ENCOUNTER — APPOINTMENT (OUTPATIENT)
Dept: NEUROLOGY | Facility: CLINIC | Age: 88
End: 2024-12-18

## 2025-01-10 ENCOUNTER — RX RENEWAL (OUTPATIENT)
Age: 89
End: 2025-01-10

## 2025-01-27 ENCOUNTER — NON-APPOINTMENT (OUTPATIENT)
Age: 89
End: 2025-01-27

## 2025-01-27 ENCOUNTER — APPOINTMENT (OUTPATIENT)
Dept: ELECTROPHYSIOLOGY | Facility: CLINIC | Age: 89
End: 2025-01-27
Payer: MEDICARE

## 2025-01-27 PROCEDURE — 93296 REM INTERROG EVL PM/IDS: CPT

## 2025-01-27 PROCEDURE — 93294 REM INTERROG EVL PM/LDLS PM: CPT

## 2025-02-06 ENCOUNTER — RX RENEWAL (OUTPATIENT)
Age: 89
End: 2025-02-06

## 2025-03-07 ENCOUNTER — NON-APPOINTMENT (OUTPATIENT)
Age: 89
End: 2025-03-07

## 2025-04-29 ENCOUNTER — APPOINTMENT (OUTPATIENT)
Dept: ELECTROPHYSIOLOGY | Facility: CLINIC | Age: 89
End: 2025-04-29
Payer: MEDICARE

## 2025-04-29 ENCOUNTER — NON-APPOINTMENT (OUTPATIENT)
Age: 89
End: 2025-04-29

## 2025-04-29 PROCEDURE — 93294 REM INTERROG EVL PM/LDLS PM: CPT

## 2025-04-29 PROCEDURE — 93296 REM INTERROG EVL PM/IDS: CPT

## 2025-06-28 ENCOUNTER — NON-APPOINTMENT (OUTPATIENT)
Age: 89
End: 2025-06-28

## 2025-06-30 ENCOUNTER — APPOINTMENT (OUTPATIENT)
Dept: NEUROLOGY | Facility: CLINIC | Age: 89
End: 2025-06-30
Payer: MEDICARE

## 2025-06-30 VITALS
BODY MASS INDEX: 22.66 KG/M2 | SYSTOLIC BLOOD PRESSURE: 181 MMHG | HEART RATE: 61 BPM | HEIGHT: 61 IN | RESPIRATION RATE: 15 BRPM | DIASTOLIC BLOOD PRESSURE: 69 MMHG | WEIGHT: 120 LBS

## 2025-06-30 PROCEDURE — 99214 OFFICE O/P EST MOD 30 MIN: CPT

## 2025-07-25 ENCOUNTER — RX RENEWAL (OUTPATIENT)
Age: 89
End: 2025-07-25

## 2025-07-29 ENCOUNTER — NON-APPOINTMENT (OUTPATIENT)
Age: 89
End: 2025-07-29

## 2025-07-29 ENCOUNTER — APPOINTMENT (OUTPATIENT)
Dept: ELECTROPHYSIOLOGY | Facility: CLINIC | Age: 89
End: 2025-07-29
Payer: MEDICARE

## 2025-07-29 PROCEDURE — 93294 REM INTERROG EVL PM/LDLS PM: CPT

## 2025-07-29 PROCEDURE — 93296 REM INTERROG EVL PM/IDS: CPT
